# Patient Record
Sex: FEMALE | Race: WHITE | Employment: UNEMPLOYED | URBAN - METROPOLITAN AREA
[De-identification: names, ages, dates, MRNs, and addresses within clinical notes are randomized per-mention and may not be internally consistent; named-entity substitution may affect disease eponyms.]

---

## 2017-01-13 RX ORDER — NITROGLYCERIN 0.4 MG/1
TABLET SUBLINGUAL
Qty: 25 TABLET | Refills: 1 | Status: SHIPPED | OUTPATIENT
Start: 2017-01-13 | End: 2018-07-17 | Stop reason: SDUPTHER

## 2017-01-30 RX ORDER — LEVOTHYROXINE SODIUM 0.03 MG/1
TABLET ORAL
Qty: 30 TABLET | Refills: 3 | Status: SHIPPED | OUTPATIENT
Start: 2017-01-30 | End: 2017-03-30 | Stop reason: SDUPTHER

## 2017-02-20 RX ORDER — AMLODIPINE BESYLATE 2.5 MG/1
TABLET ORAL
Qty: 90 TABLET | Refills: 3 | Status: SHIPPED | OUTPATIENT
Start: 2017-02-20 | End: 2018-02-28 | Stop reason: SDUPTHER

## 2017-02-20 RX ORDER — ATORVASTATIN CALCIUM 10 MG/1
TABLET, FILM COATED ORAL
Qty: 90 TABLET | Refills: 3 | Status: SHIPPED | OUTPATIENT
Start: 2017-02-20 | End: 2017-03-30 | Stop reason: ALTCHOICE

## 2017-02-22 RX ORDER — FLUOXETINE HYDROCHLORIDE 20 MG/1
CAPSULE ORAL
Qty: 30 CAPSULE | Refills: 5 | Status: SHIPPED | OUTPATIENT
Start: 2017-02-22

## 2017-03-27 RX ORDER — LORATADINE 10 MG
TABLET ORAL
Qty: 30 TABLET | Refills: 2 | Status: SHIPPED | OUTPATIENT
Start: 2017-03-27

## 2017-03-30 ENCOUNTER — OFFICE VISIT (OUTPATIENT)
Dept: FAMILY MEDICINE CLINIC | Age: 47
End: 2017-03-30

## 2017-03-30 VITALS — WEIGHT: 170 LBS | BODY MASS INDEX: 33.38 KG/M2 | HEIGHT: 60 IN

## 2017-03-30 DIAGNOSIS — R68.89 FLU-LIKE SYMPTOMS: Primary | ICD-10-CM

## 2017-03-30 DIAGNOSIS — J06.9 BACTERIAL URI: ICD-10-CM

## 2017-03-30 DIAGNOSIS — B96.89 BACTERIAL URI: ICD-10-CM

## 2017-03-30 LAB
INFLUENZA A ANTIBODY: NORMAL
INFLUENZA B ANTIBODY: NORMAL

## 2017-03-30 PROCEDURE — G8484 FLU IMMUNIZE NO ADMIN: HCPCS | Performed by: FAMILY MEDICINE

## 2017-03-30 PROCEDURE — 1036F TOBACCO NON-USER: CPT | Performed by: FAMILY MEDICINE

## 2017-03-30 PROCEDURE — G8427 DOCREV CUR MEDS BY ELIG CLIN: HCPCS | Performed by: FAMILY MEDICINE

## 2017-03-30 PROCEDURE — 87804 INFLUENZA ASSAY W/OPTIC: CPT | Performed by: FAMILY MEDICINE

## 2017-03-30 PROCEDURE — 99213 OFFICE O/P EST LOW 20 MIN: CPT | Performed by: FAMILY MEDICINE

## 2017-03-30 PROCEDURE — G8417 CALC BMI ABV UP PARAM F/U: HCPCS | Performed by: FAMILY MEDICINE

## 2017-03-30 RX ORDER — AZITHROMYCIN 250 MG/1
250 TABLET, FILM COATED ORAL DAILY
Qty: 1 PACKET | Refills: 0 | Status: SHIPPED | OUTPATIENT
Start: 2017-03-30 | End: 2017-06-16

## 2017-03-30 RX ORDER — FLUTICASONE PROPIONATE 50 MCG
2 SPRAY, SUSPENSION (ML) NASAL DAILY
Qty: 1 BOTTLE | Refills: 0 | Status: ON HOLD | OUTPATIENT
Start: 2017-03-30 | End: 2018-06-04

## 2017-03-30 RX ORDER — MORPHINE SULFATE 15 MG/1
15 TABLET ORAL 2 TIMES DAILY
Status: ON HOLD | COMMUNITY
End: 2018-06-05

## 2017-03-30 RX ORDER — BENZONATATE 100 MG/1
100 CAPSULE ORAL 3 TIMES DAILY PRN
Qty: 30 CAPSULE | Refills: 1 | Status: SHIPPED | OUTPATIENT
Start: 2017-03-30 | End: 2017-04-09

## 2017-03-30 ASSESSMENT — ENCOUNTER SYMPTOMS
SORE THROAT: 1
COUGH: 1
EYE PAIN: 0
EYE ITCHING: 0
ABDOMINAL PAIN: 0
VOMITING: 0
DIARRHEA: 0
CONSTIPATION: 0
SHORTNESS OF BREATH: 1
EYE DISCHARGE: 0
RHINORRHEA: 0
NAUSEA: 0

## 2017-04-03 ENCOUNTER — TELEPHONE (OUTPATIENT)
Dept: FAMILY MEDICINE CLINIC | Age: 47
End: 2017-04-03

## 2017-04-04 ENCOUNTER — TELEPHONE (OUTPATIENT)
Dept: FAMILY MEDICINE CLINIC | Age: 47
End: 2017-04-04

## 2017-08-14 ENCOUNTER — OFFICE VISIT (OUTPATIENT)
Dept: CARDIOLOGY CLINIC | Age: 47
End: 2017-08-14

## 2017-08-14 ENCOUNTER — HOSPITAL ENCOUNTER (OUTPATIENT)
Dept: GENERAL RADIOLOGY | Age: 47
Discharge: HOME | End: 2017-08-14
Attending: INTERNAL MEDICINE | Admitting: INTERNAL MEDICINE

## 2017-08-14 VITALS
BODY MASS INDEX: 33.18 KG/M2 | HEIGHT: 60 IN | HEART RATE: 72 BPM | WEIGHT: 169 LBS | SYSTOLIC BLOOD PRESSURE: 118 MMHG | DIASTOLIC BLOOD PRESSURE: 80 MMHG | OXYGEN SATURATION: 97 %

## 2017-08-14 DIAGNOSIS — I20.1 PRINZMETAL ANGINA (HCC): Primary | ICD-10-CM

## 2017-08-14 DIAGNOSIS — I10 ESSENTIAL HYPERTENSION: Chronic | ICD-10-CM

## 2017-08-14 DIAGNOSIS — E55.9 VITAMIN D DEFICIENCY: ICD-10-CM

## 2017-08-14 DIAGNOSIS — I25.119 CORONARY ARTERY DISEASE INVOLVING NATIVE CORONARY ARTERY OF NATIVE HEART WITH ANGINA PECTORIS (HCC): ICD-10-CM

## 2017-08-14 LAB — VITAMIN D 25-HYDROXY: 32.1 NG/ML

## 2017-08-14 PROCEDURE — 1036F TOBACCO NON-USER: CPT | Performed by: INTERNAL MEDICINE

## 2017-08-14 PROCEDURE — G8598 ASA/ANTIPLAT THER USED: HCPCS | Performed by: INTERNAL MEDICINE

## 2017-08-14 PROCEDURE — 99214 OFFICE O/P EST MOD 30 MIN: CPT | Performed by: INTERNAL MEDICINE

## 2017-08-14 PROCEDURE — G8417 CALC BMI ABV UP PARAM F/U: HCPCS | Performed by: INTERNAL MEDICINE

## 2017-08-14 PROCEDURE — G8427 DOCREV CUR MEDS BY ELIG CLIN: HCPCS | Performed by: INTERNAL MEDICINE

## 2017-08-14 RX ORDER — PRAVASTATIN SODIUM 10 MG
5 TABLET ORAL DAILY
Qty: 30 TABLET | Refills: 5 | Status: SHIPPED | OUTPATIENT
Start: 2017-08-14 | End: 2018-08-09 | Stop reason: SDUPTHER

## 2017-08-15 ENCOUNTER — TELEPHONE (OUTPATIENT)
Dept: CARDIOLOGY CLINIC | Age: 47
End: 2017-08-15

## 2017-10-16 RX ORDER — RANOLAZINE 1000 MG/1
500 TABLET, FILM COATED, EXTENDED RELEASE ORAL 2 TIMES DAILY
Qty: 60 TABLET | Refills: 11 | Status: SHIPPED | OUTPATIENT
Start: 2017-10-16 | End: 2018-11-02 | Stop reason: SDUPTHER

## 2017-10-20 ENCOUNTER — HOSPITAL ENCOUNTER (OUTPATIENT)
Dept: MRI IMAGING | Age: 47
Discharge: HOME | End: 2017-10-20

## 2017-10-20 DIAGNOSIS — M50.20 DISPLACEMENT OF CERVICAL INTERVERTEBRAL DISC WITHOUT MYELOPATHY: ICD-10-CM

## 2017-12-18 ENCOUNTER — TELEPHONE (OUTPATIENT)
Dept: FAMILY MEDICINE CLINIC | Age: 47
End: 2017-12-18

## 2018-02-12 ENCOUNTER — OFFICE VISIT (OUTPATIENT)
Dept: CARDIOLOGY CLINIC | Age: 48
End: 2018-02-12

## 2018-02-12 VITALS
HEART RATE: 72 BPM | SYSTOLIC BLOOD PRESSURE: 124 MMHG | BODY MASS INDEX: 33.26 KG/M2 | DIASTOLIC BLOOD PRESSURE: 86 MMHG | HEIGHT: 60 IN | WEIGHT: 169.4 LBS | OXYGEN SATURATION: 96 %

## 2018-02-12 DIAGNOSIS — I25.118 CORONARY ARTERY DISEASE INVOLVING NATIVE CORONARY ARTERY OF NATIVE HEART WITH OTHER FORM OF ANGINA PECTORIS (HCC): ICD-10-CM

## 2018-02-12 DIAGNOSIS — E78.2 MIXED HYPERLIPIDEMIA: ICD-10-CM

## 2018-02-12 DIAGNOSIS — I20.8 CARDIAC SYNDROME X (HCC): ICD-10-CM

## 2018-02-12 DIAGNOSIS — I10 ESSENTIAL HYPERTENSION: Primary | ICD-10-CM

## 2018-02-12 PROCEDURE — G8417 CALC BMI ABV UP PARAM F/U: HCPCS | Performed by: NURSE PRACTITIONER

## 2018-02-12 PROCEDURE — G8598 ASA/ANTIPLAT THER USED: HCPCS | Performed by: NURSE PRACTITIONER

## 2018-02-12 PROCEDURE — 1036F TOBACCO NON-USER: CPT | Performed by: NURSE PRACTITIONER

## 2018-02-12 PROCEDURE — 99213 OFFICE O/P EST LOW 20 MIN: CPT | Performed by: NURSE PRACTITIONER

## 2018-02-12 PROCEDURE — G8484 FLU IMMUNIZE NO ADMIN: HCPCS | Performed by: NURSE PRACTITIONER

## 2018-02-12 PROCEDURE — G8427 DOCREV CUR MEDS BY ELIG CLIN: HCPCS | Performed by: NURSE PRACTITIONER

## 2018-02-12 NOTE — LETTER
[address] Cardiology - [facility] [address]  Phone: [phone]  Fax: 305 [facility],         February 12, 2018     Jyotsna Samson MD  Fortino Coburn    Patient: Atiya Swenson  MR Number: [id number]  YOB: 1970  Date of Visit: 2/12/2018    Dear Dr. Jyotsna Samson: Thank you for the request for consultation for [address] [place] [address] to me for the evaluation. Below are the relevant portions of my assessment and plan of care. [address]   Cardiac Evaluation    Primary Care Doctor:  Jyotsna Samson MD    Chief Complaint   Patient presents with    6 Month Follow-Up    Hypertension    Chest Pain     when she does to much she has sob with cp    Shortness of Breath     with any activity    Dizziness     at times gets dizzy, she states she had the flu when she was dizzy.  Edema     feet and legs when she is up all day    Fatigue     due to flu and strep     Palpitations     pounds art times. History of Present Illness:   I had the pleasure of seeing Atiya Swenson in follow up for NOCAD with endothelial dysfunction and vasospasm, stable angina, HTN, HLD. At last visit she was started on pravastatin 5 mg due to intolerance to Vytorin, pravastatin (higher dose) and atorvastatin. She is tolerating this okay. She reports chest pain with moderate activities such as vacuuming. She exercises regularly with walking and pool/ water exercises. She does okay with this. She is limited due to back problems and fibromyalgia's. She has shortness of breath with activity resolves with rest.  She had 2 episodes of flu with strep infection in between. She had lightheadedness with this. She has some palpitations, worse when she lies down, resolves if she concentrates on something else.       Atiya Swenson describes symptoms including chest pain, dyspnea, fatigue, edema, palpitations but denies orthopnea, PND, early saiety, syncope. NYHA:   IIb  ACC/ AHA Stage:    C    Past Medical History:   has a past medical history of DDD (degenerative disc disease); Ehler's-Danlos syndrome; Factor V Leiden ([address] Fibromyalgia; Hyperlipidemia; Hypertension; IBS (irritable bowel syndrome); Intervertebral disc protrusion; Migraine; MRSA (methicillin resistant staph aureus) culture positive; MRSA nasal colonization; Nausea & vomiting; Psychiatric problem; Reflux; Thyroid disease; and Wears glasses. Surgical History:   has a past surgical history that includes Hysterectomy; Tonsillectomy; Foot surgery; laparoscopy (3/16/2012); fracture surgery; Endoscopy, colon, diagnostic; Colonoscopy; eye surgery; Upper gastrointestinal endoscopy (9/1012); and Cardiac catheterization (2/28/2014). Social History:   reports that she has never smoked. She has never used smokeless tobacco. She reports that she does not drink alcohol or use drugs. Family History:   Family History   Problem Relation Age of Onset    Heart Disease Mother     Kidney Disease Mother     Stroke Father     Cancer Father     Heart Disease Father     Heart Disease Brother        Home Medications:  Prior to Admission medications    Medication Sig Start Date End Date Taking? Authorizing Provider   Cholecalciferol (VITAMIN D PO) Take by mouth   Yes Historical Provider, MD   RANEXA 1000 MG extended release tablet TAKE 1 TABLET BY MOUTH 2 TIMES DAILY 10/16/17  Yes Yaritza Asencio MD   pravastatin (PRAVACHOL) 10 MG tablet Take 0.5 tablets by mouth daily 8/14/17  Yes John Rodriguez MD   ondansetron (ZOFRAN ODT) 4 MG disintegrating tablet Take 1 tablet by mouth every 8 hours as needed for Nausea or Vomiting 6/16/17  Yes Ginger Mora NP   morphine (MSIR) 15 MG tablet Take 15 mg by mouth 2 times daily .    Yes Historical Provider, MD   [facility] ASPIRIN ADULT LOW DOSE 81 MG chewable tablet TAKE 1 TABLET BY MOUTH HCT 39.7 06/16/2017    HCT 38.6 10/08/2015    HCT 35.1 06/21/2015    MCV 89.0 06/16/2017    MCV 92.3 10/08/2015    MCV 92.7 06/21/2015    RDW 14.3 06/16/2017    RDW 13.7 10/08/2015    RDW 13.4 06/21/2015     06/16/2017     10/08/2015     06/21/2015     BMP:  Lab Results   Component Value Date     06/16/2017     10/08/2015     06/21/2015    K 4.0 06/16/2017    K 4.1 10/08/2015    K 4.1 06/21/2015     06/16/2017     10/08/2015     06/21/2015    CO2 24 06/16/2017    CO2 23 10/08/2015    CO2 28 06/21/2015    PHOS 3.8 06/21/2015    PHOS 2.8 06/19/2015    PHOS 3.0 06/17/2015    BUN 13 06/16/2017    BUN 16 10/08/2015    BUN 4 06/21/2015    CREATININE 1.0 06/16/2017    CREATININE 1.0 10/08/2015    CREATININE 0.9 06/21/2015     BNP: No results found for: PROBNP     Cardiac Imaging:  STRESS TEST: NM 02/03/2014  Findings: Mildly decreased uptake is seen in the inferolateral wall extending to the apex. This appears similar on both stress and rest images. Perfusion is otherwise normal with no reversibility identified. Left ventricular cavity size is normal. Wall motion is uniform. The estimated left ventricular ejection fraction is 74%. Impression: Probable inferolateral and apical scar with no reversibility seen. CARDIAC CATH: 03/03/2014  ANGIOGRAPHIC FINDINGS:  1. Left main coronary artery was short, with no significant focal stenosis. It did appear to trifurcate into an LAD, ramus branch, and left circumflex. 2.  The LAD artery was a large vessel that ran in the intraventricular groove before wrapping the apex distally. Had evidence of luminal irregularities throughout its course, without significant focal stenosis. 3.  The ramus intermedius branch was also a very large vessel that ran on the anterior aspect of the heart. It did branch in its distal aspects.   There was evidence of a tubular lesion about 20% in the proximal portion

## 2018-02-12 NOTE — PROGRESS NOTES
[address]   Cardiac Evaluation    Primary Care Doctor:  Delfina Harding MD    Chief Complaint   Patient presents with    6 Month Follow-Up    Hypertension    Chest Pain     when she does to much she has sob with cp    Shortness of Breath     with any activity    Dizziness     at times gets dizzy, she states she had the flu when she was dizzy.  Edema     feet and legs when she is up all day    Fatigue     due to flu and strep     Palpitations     pounds art times. History of Present Illness:   I had the pleasure of seeing Yazan Melendez in follow up for NOCAD with endothelial dysfunction and vasospasm, stable angina, HTN, HLD. At last visit she was started on pravastatin 5 mg due to intolerance to Vytorin, pravastatin (higher dose) and atorvastatin. She is tolerating this okay. She reports chest pain with moderate activities such as vacuuming. She exercises regularly with walking and pool/ water exercises. She does okay with this. She is limited due to back problems and fibromyalgia's. She has shortness of breath with activity resolves with rest.  She had 2 episodes of flu with strep infection in between. She had lightheadedness with this. She has some palpitations, worse when she lies down, resolves if she concentrates on something else. Yazan  describes symptoms including chest pain, dyspnea, fatigue, edema, palpitations but denies orthopnea, PND, early saiety, syncope. NYHA:   IIb  ACC/ AHA Stage:    C    Past Medical History:   has a past medical history of DDD (degenerative disc disease); Ehler's-Danlos syndrome; Factor V Leiden ([address] Fibromyalgia; Hyperlipidemia; Hypertension; IBS (irritable bowel syndrome); Intervertebral disc protrusion; Migraine; MRSA (methicillin resistant staph aureus) culture positive; MRSA nasal colonization; Nausea & vomiting; Psychiatric problem; Reflux; Thyroid disease; and Wears glasses.   Surgical History:   has a past surgical history that includes Hysterectomy; Tonsillectomy; Foot surgery; laparoscopy (3/16/2012); fracture surgery; Endoscopy, colon, diagnostic; Colonoscopy; eye surgery; Upper gastrointestinal endoscopy (9/1012); and Cardiac catheterization (2/28/2014). Social History:   reports that she has never smoked. She has never used smokeless tobacco. She reports that she does not drink alcohol or use drugs. Family History:   Family History   Problem Relation Age of Onset    Heart Disease Mother     Kidney Disease Mother     Stroke Father     Cancer Father     Heart Disease Father     Heart Disease Brother        Home Medications:  Prior to Admission medications    Medication Sig Start Date End Date Taking? Authorizing Provider   Cholecalciferol (VITAMIN D PO) Take by mouth   Yes Historical Provider, MD   RANEXA 1000 MG extended release tablet TAKE 1 TABLET BY MOUTH 2 TIMES DAILY 10/16/17  Yes Nirav Folye MD   pravastatin (PRAVACHOL) 10 MG tablet Take 0.5 tablets by mouth daily 8/14/17  Yes Ti Arnold MD   ondansetron (ZOFRAN ODT) 4 MG disintegrating tablet Take 1 tablet by mouth every 8 hours as needed for Nausea or Vomiting 6/16/17  Yes Anselmo Mccoy NP   morphine (MSIR) 15 MG tablet Take 15 mg by mouth 2 times daily . Yes Historical Provider, MD   [facility] ASPIRIN ADULT LOW DOSE 81 MG chewable tablet TAKE 1 TABLET BY MOUTH EVERY DAY 3/27/17  Yes Marisa Gruber MD   FLUoxetine (PROZAC) 20 MG capsule TAKE 1 CAPSULE BY MOUTH DAILY. 2/22/17  Yes Marisa Gruber MD   amLODIPine (NORVASC) 2.5 MG tablet TAKE 1 TABLET BY MOUTH DAILY. 2/20/17  Yes Ti Arnold MD   nitroGLYCERIN (NITROSTAT) 0.4 MG SL tablet Place one tablet under the tongue every 5 minutes as needed for chest pain.  1/13/17  Yes Ti Arnold MD   clorazepate (TRANXENE) 7.5 MG tablet TAKE 1 TABLET BY MOUTH TWICE A DAY AS NEEDED FOR ANXIETY 1/13/17  Yes Marisa Gruber MD   LYRICA 50 MG capsule TAKE ONE CAPSULE BY MOUTH DAILY 7/6/16  Yes Historical Provider, MD   levothyroxine (SYNTHROID) 25 MCG tablet TAKE 1 TABLET BY MOUTH DAILY. 9/9/15  Yes Tammy Salamanca MD   omeprazole (PRILOSEC) 40 MG capsule TAKE ONE CAPSULE EVERY DAY 7/5/15  Yes Davin Soler DO   ibuprofen (ADVIL;MOTRIN) 600 MG tablet Take 1 tablet by mouth 3 times daily. 10/12/14  Yes Guillermina Fajardo MD   butalbital-acetaminophen-caffeine (FIORICET, ESGIC) per tablet Take 1 tablet by mouth every 4 hours as needed for Pain or Headaches. Yes Historical Provider, MD   Polyethylene Glycol 3350 (MIRALAX PO) Take  by mouth. Yes Historical Provider, MD   fluticasone [facility]) 50 MCG/ACT nasal spray 2 sprays by Nasal route daily for 5 days 3/30/17 4/4/17  Jacquie Robles DO        Allergies:  Prochlorperazine edisylate; Neurontin [gabapentin]; Other; Hydrocodone-acetaminophen; and Hydromorphone     Review of Systems:   · Constitutional: there has been no unanticipated weight loss. There's been no change in energy level, sleep pattern, or activity level. · Eyes: No visual changes or diplopia. No scleral icterus. · ENT: No Headaches, hearing loss or vertigo. No mouth sores or sore throat. · Cardiovascular: Reviewed in HPI  · Respiratory: No cough or wheezing, no sputum production. No hematemesis. · Gastrointestinal: No abdominal pain, appetite loss, blood in stools. No change in bowel or bladder habits. · Genitourinary: No dysuria, trouble voiding, or hematuria. · Musculoskeletal:  No gait disturbance, weakness or joint complaints. · Integumentary: No rash or pruritis. · Neurological: No headache, diplopia, change in muscle strength, numbness or tingling. No change in gait, balance, coordination, mood, affect, memory, mentation, behavior. · Psychiatric: No anxiety, no depression. · Endocrine: No malaise, fatigue or temperature intolerance. No excessive thirst, fluid intake, or urination. No tremor.   · Hematologic/Lymphatic: No abnormal bruising or bleeding, blood clots or swollen lymph nodes.  · Allergic/Immunologic: No nasal congestion or hives. Physical Examination:    Vitals:    02/12/18 1105   BP: 124/86   Pulse: 72   SpO2: 96%   Weight: 169 lb 6.4 oz (76.8 kg)   Height: 5' (1.524 m)        Constitutional and General Appearance: Warm and dry, no apparent distress, normal coloration  HEENT:  Normocephalic, atraumatic  Respiratory:  · Normal excursion and expansion without use of accessory muscles  · Resp Auscultation: Normal breath sounds without dullness  Cardiovascular:  · The apical impulses not displaced  · Heart tones are crisp and normal  · JVP 8 cm H2O  · Regular rate and rhythm, normal S1S2, no m/g/r/c  · Peripheral pulses are symmetrical and full  · There is no clubbing, cyanosis of the extremities.   · No edema  · Pedal Pulses: 2+ and equal   Abdomen:  · No masses or tenderness  · Liver/Spleen: No Abnormalities Noted  Neurological/Psychiatric:  · Alert and oriented in all spheres  · Moves all extremities well  · Exhibits normal gait balance and coordination  · No abnormalities of mood, affect, memory, mentation, or behavior are noted    Lab Data:    CBC:   Lab Results   Component Value Date    WBC 8.9 06/16/2017    WBC 6.5 10/08/2015    WBC 5.2 06/21/2015    RBC 4.46 06/16/2017    RBC 4.18 10/08/2015    RBC 3.79 06/21/2015    HGB 13.5 06/16/2017    HGB 12.7 10/08/2015    HGB 11.7 06/21/2015    HCT 39.7 06/16/2017    HCT 38.6 10/08/2015    HCT 35.1 06/21/2015    MCV 89.0 06/16/2017    MCV 92.3 10/08/2015    MCV 92.7 06/21/2015    RDW 14.3 06/16/2017    RDW 13.7 10/08/2015    RDW 13.4 06/21/2015     06/16/2017     10/08/2015     06/21/2015     BMP:  Lab Results   Component Value Date     06/16/2017     10/08/2015     06/21/2015    K 4.0 06/16/2017    K 4.1 10/08/2015    K 4.1 06/21/2015     06/16/2017     10/08/2015     06/21/2015    CO2 24 06/16/2017    CO2 23 10/08/2015    CO2 28 06/21/2015    PHOS 3.8 06/21/2015    PHOS 2.8 06/19/2015    PHOS 3.0 06/17/2015    BUN 13 06/16/2017    BUN 16 10/08/2015    BUN 4 06/21/2015    CREATININE 1.0 06/16/2017    CREATININE 1.0 10/08/2015    CREATININE 0.9 06/21/2015     BNP: No results found for: PROBNP     Cardiac Imaging:  STRESS TEST: NM 02/03/2014  Findings: Mildly decreased uptake is seen in the inferolateral wall extending to the apex. This appears similar on both stress and rest images. Perfusion is otherwise normal with no reversibility identified. Left ventricular cavity size is normal. Wall motion is uniform. The estimated left ventricular ejection fraction is 74%. Impression: Probable inferolateral and apical scar with no reversibility seen. CARDIAC CATH: 03/03/2014  ANGIOGRAPHIC FINDINGS:  1. Left main coronary artery was short, with no significant focal stenosis. It did appear to trifurcate into an LAD, ramus branch, and left circumflex. 2.  The LAD artery was a large vessel that ran in the intraventricular groove before wrapping the apex distally. Had evidence of luminal irregularities throughout its course, without significant focal stenosis. 3.  The ramus intermedius branch was also a very large vessel that ran on the anterior aspect of the heart. It did branch in its distal aspects. There was evidence of a tubular lesion about 20% in the proximal portion that was not flow limiting. 4.  The left circumflex was a very large vessel, giving off a decent sized OM branch, as well as several small PLOMs. There was a 20% to 30% lesion that was noted in the proximal portion of the left circumflex. However, the diagnostic catheter was deeply seated. After the administration of sublingual nitroglycerin, this did improve, consistent with coronary vasospasm. 5.  The right coronary artery was noted to be dominant. Had luminal irregularities through out its course.   There was slow CHRIS-2 flow throughout the LAD, consistent with microvascular disease/endophyll

## 2018-02-12 NOTE — COMMUNICATION BODY
Provider, MD   levothyroxine (SYNTHROID) 25 MCG tablet TAKE 1 TABLET BY MOUTH DAILY. 9/9/15  Yes Damion Uribe MD   omeprazole (PRILOSEC) 40 MG capsule TAKE ONE CAPSULE EVERY DAY 7/5/15  Yes Corona Samaniego DO   ibuprofen (ADVIL;MOTRIN) 600 MG tablet Take 1 tablet by mouth 3 times daily. 10/12/14  Yes Nahomi Fajardo MD   butalbital-acetaminophen-caffeine (FIORICET, ESGIC) per tablet Take 1 tablet by mouth every 4 hours as needed for Pain or Headaches. Yes Historical Provider, MD   Polyethylene Glycol 3350 (MIRALAX PO) Take  by mouth. Yes Historical Provider, MD   fluticasone [facility] Osier) 50 MCG/ACT nasal spray 2 sprays by Nasal route daily for 5 days 3/30/17 4/4/17  Aaron Gowers, DO        Allergies:  Prochlorperazine edisylate; Neurontin [gabapentin]; Other; Hydrocodone-acetaminophen; and Hydromorphone     Review of Systems:   · Constitutional: there has been no unanticipated weight loss. There's been no change in energy level, sleep pattern, or activity level. · Eyes: No visual changes or diplopia. No scleral icterus. · ENT: No Headaches, hearing loss or vertigo. No mouth sores or sore throat. · Cardiovascular: Reviewed in HPI  · Respiratory: No cough or wheezing, no sputum production. No hematemesis. · Gastrointestinal: No abdominal pain, appetite loss, blood in stools. No change in bowel or bladder habits. · Genitourinary: No dysuria, trouble voiding, or hematuria. · Musculoskeletal:  No gait disturbance, weakness or joint complaints. · Integumentary: No rash or pruritis. · Neurological: No headache, diplopia, change in muscle strength, numbness or tingling. No change in gait, balance, coordination, mood, affect, memory, mentation, behavior. · Psychiatric: No anxiety, no depression. · Endocrine: No malaise, fatigue or temperature intolerance. No excessive thirst, fluid intake, or urination. No tremor.   · Hematologic/Lymphatic: No abnormal bruising or bleeding, blood clots or swollen lymph 06/19/2015    PHOS 3.0 06/17/2015    BUN 13 06/16/2017    BUN 16 10/08/2015    BUN 4 06/21/2015    CREATININE 1.0 06/16/2017    CREATININE 1.0 10/08/2015    CREATININE 0.9 06/21/2015     BNP: No results found for: PROBNP     Cardiac Imaging:  STRESS TEST: NM 02/03/2014  Findings: Mildly decreased uptake is seen in the inferolateral wall extending to the apex. This appears similar on both stress and rest images. Perfusion is otherwise normal with no reversibility identified. Left ventricular cavity size is normal. Wall motion is uniform. The estimated left ventricular ejection fraction is 74%. Impression: Probable inferolateral and apical scar with no reversibility seen. CARDIAC CATH: 03/03/2014  ANGIOGRAPHIC FINDINGS:  1. Left main coronary artery was short, with no significant focal stenosis. It did appear to trifurcate into an LAD, ramus branch, and left circumflex. 2.  The LAD artery was a large vessel that ran in the intraventricular groove before wrapping the apex distally. Had evidence of luminal irregularities throughout its course, without significant focal stenosis. 3.  The ramus intermedius branch was also a very large vessel that ran on the anterior aspect of the heart. It did branch in its distal aspects. There was evidence of a tubular lesion about 20% in the proximal portion that was not flow limiting. 4.  The left circumflex was a very large vessel, giving off a decent sized OM branch, as well as several small PLOMs. There was a 20% to 30% lesion that was noted in the proximal portion of the left circumflex. However, the diagnostic catheter was deeply seated. After the administration of sublingual nitroglycerin, this did improve, consistent with coronary vasospasm. 5.  The right coronary artery was noted to be dominant. Had luminal irregularities through out its course.   There was slow CHRIS-2 flow throughout the LAD, consistent with microvascular disease/endophyll

## 2018-02-28 RX ORDER — AMLODIPINE BESYLATE 2.5 MG/1
TABLET ORAL
Qty: 90 TABLET | Refills: 3 | Status: SHIPPED | OUTPATIENT
Start: 2018-02-28 | End: 2019-02-12 | Stop reason: SDUPTHER

## 2018-06-05 PROBLEM — F41.9 ANXIETY: Status: ACTIVE | Noted: 2018-06-05

## 2018-06-05 PROBLEM — I20.8 CARDIAC SYNDROME X (HCC): Status: ACTIVE | Noted: 2018-06-05

## 2018-06-05 PROBLEM — I20.89 CARDIAC SYNDROME X: Status: ACTIVE | Noted: 2018-06-05

## 2018-07-18 RX ORDER — NITROGLYCERIN 0.4 MG/1
TABLET SUBLINGUAL
Qty: 25 TABLET | Refills: 0 | Status: SHIPPED | OUTPATIENT
Start: 2018-07-18 | End: 2020-01-17

## 2018-08-09 RX ORDER — PRAVASTATIN SODIUM 10 MG
TABLET ORAL
Qty: 45 TABLET | Refills: 3 | Status: SHIPPED | OUTPATIENT
Start: 2018-08-09 | End: 2019-09-13 | Stop reason: SDUPTHER

## 2018-10-09 ENCOUNTER — OFFICE VISIT (OUTPATIENT)
Dept: CARDIOLOGY CLINIC | Age: 48
End: 2018-10-09
Payer: MEDICARE

## 2018-10-09 VITALS
SYSTOLIC BLOOD PRESSURE: 100 MMHG | DIASTOLIC BLOOD PRESSURE: 70 MMHG | WEIGHT: 166.8 LBS | OXYGEN SATURATION: 98 % | BODY MASS INDEX: 32.58 KG/M2 | HEART RATE: 75 BPM

## 2018-10-09 DIAGNOSIS — I20.8 CARDIAC SYNDROME X (HCC): Primary | ICD-10-CM

## 2018-10-09 DIAGNOSIS — I10 ESSENTIAL HYPERTENSION: Chronic | ICD-10-CM

## 2018-10-09 DIAGNOSIS — Z86.79 HISTORY OF CORONARY ARTERY DISEASE: ICD-10-CM

## 2018-10-09 DIAGNOSIS — E78.2 MIXED HYPERLIPIDEMIA: ICD-10-CM

## 2018-10-09 PROCEDURE — 99214 OFFICE O/P EST MOD 30 MIN: CPT | Performed by: INTERNAL MEDICINE

## 2018-10-09 NOTE — LETTER
No evidence for sudden cardiac death or premature CAD    Home Medications:  Reviewed and are listed in nursing record. and/or listed below  Current Outpatient Prescriptions   Medication Sig Dispense Refill    pravastatin (PRAVACHOL) 10 MG tablet TAKE ONE-HALF TABLET BY MOUTH DAILY 45 tablet 3    nitroGLYCERIN (NITROSTAT) 0.4 MG SL tablet PLACE ONE TABLET UNDER THE TONGUE EVERY 5 MINUTES AS NEEDED FOR CHEST PAIN. 25 tablet 0    morphine (MS CONTIN) 15 MG extended release tablet Take 15 mg by mouth 2 times daily. Vahid Acrkolby  tiZANidine (ZANAFLEX) 4 MG tablet Take 1 tablet by mouth nightly as needed  2    amLODIPine (NORVASC) 2.5 MG tablet TAKE 1 TABLET BY MOUTH DAILY. 90 tablet 3    Cholecalciferol (VITAMIN D PO) Take by mouth      RANEXA 1000 MG extended release tablet TAKE 1 TABLET BY MOUTH 2 TIMES DAILY 60 tablet 11    ondansetron (ZOFRAN ODT) 4 MG disintegrating tablet Take 1 tablet by mouth every 8 hours as needed for Nausea or Vomiting 20 tablet 0    [facility] ASPIRIN ADULT LOW DOSE 81 MG chewable tablet TAKE 1 TABLET BY MOUTH EVERY DAY 30 tablet 2    FLUoxetine (PROZAC) 20 MG capsule TAKE 1 CAPSULE BY MOUTH DAILY. 30 capsule 5    clorazepate (TRANXENE) 7.5 MG tablet TAKE 1 TABLET BY MOUTH TWICE A DAY AS NEEDED FOR ANXIETY 60 tablet 0    LYRICA 50 MG capsule TAKE ONE CAPSULE BY MOUTH DAILY  2    levothyroxine (SYNTHROID) 25 MCG tablet TAKE 1 TABLET BY MOUTH DAILY. 30 tablet 6    omeprazole (PRILOSEC) 40 MG capsule TAKE ONE CAPSULE EVERY DAY 30 capsule 0    ibuprofen (ADVIL;MOTRIN) 600 MG tablet Take 1 tablet by mouth 3 times daily. 120 tablet 0    butalbital-acetaminophen-caffeine (FIORICET, ESGIC) per tablet Take 1 tablet by mouth every 4 hours as needed for Pain or Headaches.  Polyethylene Glycol 3350 (MIRALAX PO) Take  by mouth. No current facility-administered medications for this visit. Allergies:  Prochlorperazine edisylate; Neurontin [gabapentin];  Other; was evidence of a tubular lesion about 20% in the proximal portion that was  not flow limiting. 4.  The left circumflex was a very large vessel, giving off a decent sized OM  branch, as well as several small PLOMs. There was a 20% to 30% lesion that  was noted in the proximal portion of the left circumflex. However, the  diagnostic catheter was deeply seated. After the administration of  sublingual nitroglycerin, this did improve, consistent with coronary  vasospasm. 5.  The right coronary artery was noted to be dominant. Had luminal  irregularities through out its course. There was slow CHRIS-2 flow throughout  the LAD, consistent with microvascular disease/endophyll dysfunction. ASSESSMENT AND PLAN:  At this time, the patient presents with nonobstructive  coronary artery disease as dictated above. She does have evidence of  coronary vasospasm in the left circumflex that is possibly catheter induced, that did improve with nitroglycerin. She has evidence of CHRIS-2 sluggish flow in the RCA, suggesting endothelial dysfunction. However, No significant stenosis requiring intervention at this time. We will go ahead and start her on Norvasc for her problems as above, and she will follow up with Dr. Kevin Hughes for her symptomatology. VASCULAR/OTHER IMAGING:  CTA pulm: 10/09/14  FINDINGS: IV contrast was given using CTPA technique. No pulmonary emboli. No pleural effusions. No acute. No lung mass. There is a tiny liver cyst.        Assessment and Plan   Abena Bass is a 46 y.o. female who presents today for the following problems:      1. CP/Prinzmetal's angina: secondary to coronary vasospasm vs endothelial dysfunction. - still rare occurrence doing well  2. Nonobstructive CAD  3. HLD: intolerant to statins but confounded by fibromyalgia   -  Myalgias (on 20mg dose of lipitor  4. HTN: controlled    Plan:  1. Labs - Lipids  2.  Increase pravastatin to 10 mg

## 2018-10-09 NOTE — PROGRESS NOTES
HDL 55 10/08/2015    HDL 47 10/10/2014            Lab Results   Component Value Date    LDLCALC 151 (H) 2018    LDLCALC 131 (H) 10/08/2015    LDLCALC 171 (H) 10/10/2014            Lab Results   Component Value Date    LABVLDL 44 2018    LABVLDL 44 10/08/2015    LABVLDL 47 10/10/2014         CARDIAC DATA   EK2014 HR 76  Sinus Rhythm    ECHO/MUGA:    STRESS TEST: NM 2014  Findings: Mildly decreased uptake is seen in the inferolateral wall extending to the apex. This appears similar on both stress and rest images. Perfusion is otherwise normal with no reversibility identified. Left ventricular cavity size is normal. Wall motion is uniform. The estimated left ventricular ejection fraction is 74%. Impression: Probable inferolateral and apical scar with no reversibility seen. CARDIAC CATH: 2014  ANGIOGRAPHIC FINDINGS:  1. Left main coronary artery was short, with no significant focal stenosis. It did appear to trifurcate into an LAD, ramus branch, and left circumflex. 2.  The LAD artery was a large vessel that ran in the intraventricular groove  before wrapping the apex distally. Had evidence of luminal irregularities  throughout its course, without significant focal stenosis. 3.  The ramus intermedius branch was also a very large vessel that ran on the  anterior aspect of the heart. It did branch in its distal aspects. There  was evidence of a tubular lesion about 20% in the proximal portion that was  not flow limiting. 4.  The left circumflex was a very large vessel, giving off a decent sized OM  branch, as well as several small PLOMs. There was a 20% to 30% lesion that  was noted in the proximal portion of the left circumflex. However, the  diagnostic catheter was deeply seated. After the administration of  sublingual nitroglycerin, this did improve, consistent with coronary  vasospasm. 5.  The right coronary artery was noted to be dominant.   Had luminal  irregularities through out its course. There was slow CHRIS-2 flow throughout  the LAD, consistent with microvascular disease/endophyll dysfunction. ASSESSMENT AND PLAN:  At this time, the patient presents with nonobstructive  coronary artery disease as dictated above. She does have evidence of  coronary vasospasm in the left circumflex that is possibly catheter induced, that did improve with nitroglycerin. She has evidence of CHRIS-2 sluggish flow in the RCA, suggesting endothelial dysfunction. However, No significant stenosis requiring intervention at this time. We will go ahead and start her on Norvasc for her problems as above, and she will follow up with Dr. Doyle Benitez for her symptomatology. VASCULAR/OTHER IMAGING:  CTA pulm: 10/09/14  FINDINGS: IV contrast was given using CTPA technique. No pulmonary emboli. No pleural effusions. No acute. No lung mass. There is a tiny liver cyst.        Assessment and Plan   Emelyn Thomas is a 46 y.o. female who presents today for the following problems:      1. CP/Prinzmetal's angina: secondary to coronary vasospasm vs endothelial dysfunction. - still rare occurrence doing well  2. Nonobstructive CAD  3. HLD: intolerant to statins but confounded by fibromyalgia   -  Myalgias (on 20mg dose of lipitor  4. HTN: controlled    Plan:  1. Labs - Lipids  2. Increase pravastatin to 10 mg  3. Follow up with me in 1 year      It is a pleasure to assist in the care of Emelyn Thomas. Please call with any questions. All questions and concerns were addressed to the patient/family. Alternatives to my treatment were discussed. The note was completed using EMR. Every effort was made to ensure accuracy; however, inadvertent computerized transcription errors may be present.     Monet Otoole MD, [facility] [place]   [phone] [facility]  [phone] [address] [place] [address]  10/9/2018  11:24 AM

## 2018-10-15 ENCOUNTER — APPOINTMENT (OUTPATIENT)
Dept: GENERAL RADIOLOGY | Age: 48
End: 2018-10-15
Payer: MEDICARE

## 2018-10-15 ENCOUNTER — HOSPITAL ENCOUNTER (EMERGENCY)
Age: 48
Discharge: HOME | End: 2018-10-15
Attending: EMERGENCY MEDICINE
Payer: MEDICARE

## 2018-10-15 VITALS
WEIGHT: 160 LBS | SYSTOLIC BLOOD PRESSURE: 142 MMHG | HEIGHT: 60 IN | DIASTOLIC BLOOD PRESSURE: 96 MMHG | BODY MASS INDEX: 31.41 KG/M2 | TEMPERATURE: 98.4 F | OXYGEN SATURATION: 96 % | HEART RATE: 71 BPM | RESPIRATION RATE: 16 BRPM

## 2018-10-15 DIAGNOSIS — M79.672 LEFT FOOT PAIN: Primary | ICD-10-CM

## 2018-10-15 PROCEDURE — 6370000000 HC RX 637 (ALT 250 FOR IP): Performed by: EMERGENCY MEDICINE

## 2018-10-15 PROCEDURE — 99283 EMERGENCY DEPT VISIT LOW MDM: CPT

## 2018-10-15 PROCEDURE — 6360000002 HC RX W HCPCS: Performed by: EMERGENCY MEDICINE

## 2018-10-15 PROCEDURE — 73630 X-RAY EXAM OF FOOT: CPT

## 2018-10-15 PROCEDURE — 73610 X-RAY EXAM OF ANKLE: CPT

## 2018-10-15 RX ORDER — NAPROXEN 250 MG/1
500 TABLET ORAL ONCE
Status: COMPLETED | OUTPATIENT
Start: 2018-10-15 | End: 2018-10-15

## 2018-10-15 RX ORDER — ONDANSETRON 4 MG/1
4 TABLET, ORALLY DISINTEGRATING ORAL ONCE
Status: COMPLETED | OUTPATIENT
Start: 2018-10-15 | End: 2018-10-15

## 2018-10-15 RX ADMIN — NAPROXEN 500 MG: 250 TABLET ORAL at 16:07

## 2018-10-15 RX ADMIN — ONDANSETRON 4 MG: 4 TABLET, ORALLY DISINTEGRATING ORAL at 16:20

## 2018-10-15 ASSESSMENT — PAIN SCALES - GENERAL
PAINLEVEL_OUTOF10: 5
PAINLEVEL_OUTOF10: 7
PAINLEVEL_OUTOF10: 7

## 2018-10-21 NOTE — ED PROVIDER NOTES
 Drug use: No    Sexual activity: Yes     Partners: Male     Other Topics Concern    Not on file     Social History Narrative    No narrative on file     No current facility-administered medications for this encounter. Current Outpatient Prescriptions   Medication Sig Dispense Refill    pravastatin (PRAVACHOL) 10 MG tablet TAKE ONE-HALF TABLET BY MOUTH DAILY 45 tablet 3    nitroGLYCERIN (NITROSTAT) 0.4 MG SL tablet PLACE ONE TABLET UNDER THE TONGUE EVERY 5 MINUTES AS NEEDED FOR CHEST PAIN. 25 tablet 0    tiZANidine (ZANAFLEX) 4 MG tablet Take 1 tablet by mouth nightly as needed  2    amLODIPine (NORVASC) 2.5 MG tablet TAKE 1 TABLET BY MOUTH DAILY. 90 tablet 3    Cholecalciferol (VITAMIN D PO) Take by mouth      RANEXA 1000 MG extended release tablet TAKE 1 TABLET BY MOUTH 2 TIMES DAILY 60 tablet 11    ondansetron (ZOFRAN ODT) 4 MG disintegrating tablet Take 1 tablet by mouth every 8 hours as needed for Nausea or Vomiting 20 tablet 0    FLUoxetine (PROZAC) 20 MG capsule TAKE 1 CAPSULE BY MOUTH DAILY. 30 capsule 5    clorazepate (TRANXENE) 7.5 MG tablet TAKE 1 TABLET BY MOUTH TWICE A DAY AS NEEDED FOR ANXIETY 60 tablet 0    LYRICA 50 MG capsule TAKE ONE CAPSULE BY MOUTH DAILY  2    levothyroxine (SYNTHROID) 25 MCG tablet TAKE 1 TABLET BY MOUTH DAILY. 30 tablet 6    omeprazole (PRILOSEC) 40 MG capsule TAKE ONE CAPSULE EVERY DAY 30 capsule 0    ibuprofen (ADVIL;MOTRIN) 600 MG tablet Take 1 tablet by mouth 3 times daily. 120 tablet 0    Polyethylene Glycol 3350 (MIRALAX PO) Take  by mouth.  morphine (MS CONTIN) 15 MG extended release tablet Take 15 mg by mouth 2 times daily. Arturo [facility] ASPIRIN ADULT LOW DOSE 81 MG chewable tablet TAKE 1 TABLET BY MOUTH EVERY DAY 30 tablet 2    butalbital-acetaminophen-caffeine (FIORICET, ESGIC) per tablet Take 1 tablet by mouth every 4 hours as needed for Pain or Headaches.        Allergies   Allergen Reactions    Prochlorperazine Edisylate      Agitated

## 2018-11-02 RX ORDER — RANOLAZINE 1000 MG/1
1000 TABLET, EXTENDED RELEASE ORAL 2 TIMES DAILY
Qty: 180 TABLET | Refills: 3 | Status: SHIPPED | OUTPATIENT
Start: 2018-11-02 | End: 2018-11-05 | Stop reason: SDUPTHER

## 2018-11-05 RX ORDER — RANOLAZINE 1000 MG/1
500 TABLET, EXTENDED RELEASE ORAL 2 TIMES DAILY
Qty: 60 TABLET | Refills: 3 | Status: SHIPPED | OUTPATIENT
Start: 2018-11-05 | End: 2018-11-05

## 2018-11-05 RX ORDER — RANOLAZINE 1000 MG/1
1000 TABLET, EXTENDED RELEASE ORAL 2 TIMES DAILY
Qty: 180 TABLET | Refills: 3 | Status: SHIPPED | OUTPATIENT
Start: 2018-11-05 | End: 2022-02-11 | Stop reason: SDUPTHER

## 2019-02-13 RX ORDER — AMLODIPINE BESYLATE 2.5 MG/1
TABLET ORAL
Qty: 90 TABLET | Refills: 3 | Status: ON HOLD | OUTPATIENT
Start: 2019-02-13 | End: 2019-03-25 | Stop reason: HOSPADM

## 2019-03-24 ENCOUNTER — HOSPITAL ENCOUNTER (OUTPATIENT)
Age: 49
Setting detail: OBSERVATION
Discharge: HOME | End: 2019-03-25
Attending: EMERGENCY MEDICINE | Admitting: INTERNAL MEDICINE
Payer: MEDICARE

## 2019-03-24 ENCOUNTER — APPOINTMENT (OUTPATIENT)
Dept: GENERAL RADIOLOGY | Age: 49
End: 2019-03-24
Payer: MEDICARE

## 2019-03-24 DIAGNOSIS — R07.9 CHEST PAIN, UNSPECIFIED TYPE: Primary | ICD-10-CM

## 2019-03-24 LAB
A/G RATIO: 1.5 (ref 1.1–2.2)
ALBUMIN SERPL-MCNC: 4 G/DL (ref 3.4–5)
ALP BLD-CCNC: 51 U/L (ref 40–129)
ALT SERPL-CCNC: 17 U/L (ref 10–40)
ANION GAP SERPL CALCULATED.3IONS-SCNC: 10 MMOL/L (ref 3–16)
AST SERPL-CCNC: 16 U/L (ref 15–37)
BASOPHILS ABSOLUTE: 0 K/UL (ref 0–0.2)
BASOPHILS RELATIVE PERCENT: 0.7 %
BILIRUB SERPL-MCNC: <0.2 MG/DL (ref 0–1)
BUN BLDV-MCNC: 16 MG/DL (ref 7–20)
CALCIUM SERPL-MCNC: 9.2 MG/DL (ref 8.3–10.6)
CHLORIDE BLD-SCNC: 102 MMOL/L (ref 99–110)
CHOLESTEROL, TOTAL: 222 MG/DL (ref 0–199)
CO2: 27 MMOL/L (ref 21–32)
CREAT SERPL-MCNC: 1 MG/DL (ref 0.6–1.1)
EKG ATRIAL RATE: 60 BPM
EKG ATRIAL RATE: 73 BPM
EKG DIAGNOSIS: NORMAL
EKG DIAGNOSIS: NORMAL
EKG P AXIS: 49 DEGREES
EKG P AXIS: 51 DEGREES
EKG P-R INTERVAL: 140 MS
EKG P-R INTERVAL: 148 MS
EKG Q-T INTERVAL: 386 MS
EKG Q-T INTERVAL: 406 MS
EKG QRS DURATION: 90 MS
EKG QRS DURATION: 92 MS
EKG QTC CALCULATION (BAZETT): 406 MS
EKG QTC CALCULATION (BAZETT): 425 MS
EKG R AXIS: -1 DEGREES
EKG R AXIS: 0 DEGREES
EKG T AXIS: 46 DEGREES
EKG T AXIS: 50 DEGREES
EKG VENTRICULAR RATE: 60 BPM
EKG VENTRICULAR RATE: 73 BPM
EOSINOPHILS ABSOLUTE: 0 K/UL (ref 0–0.6)
EOSINOPHILS RELATIVE PERCENT: 0.6 %
GFR AFRICAN AMERICAN: >60
GFR NON-AFRICAN AMERICAN: 58
GLOBULIN: 2.7 G/DL
GLUCOSE BLD-MCNC: 96 MG/DL (ref 70–99)
HCG QUALITATIVE: NEGATIVE
HCT VFR BLD CALC: 37.6 % (ref 36–48)
HDLC SERPL-MCNC: 49 MG/DL (ref 40–60)
HEMOGLOBIN: 12.8 G/DL (ref 12–16)
LDL CHOLESTEROL CALCULATED: 136 MG/DL
LYMPHOCYTES ABSOLUTE: 3 K/UL (ref 1–5.1)
LYMPHOCYTES RELATIVE PERCENT: 48.8 %
MCH RBC QN AUTO: 31.4 PG (ref 26–34)
MCHC RBC AUTO-ENTMCNC: 34.1 G/DL (ref 31–36)
MCV RBC AUTO: 92.1 FL (ref 80–100)
MONOCYTES ABSOLUTE: 0.4 K/UL (ref 0–1.3)
MONOCYTES RELATIVE PERCENT: 6.3 %
NEUTROPHILS ABSOLUTE: 2.7 K/UL (ref 1.7–7.7)
NEUTROPHILS RELATIVE PERCENT: 43.6 %
PDW BLD-RTO: 13.1 % (ref 12.4–15.4)
PLATELET # BLD: 303 K/UL (ref 135–450)
PMV BLD AUTO: 7.1 FL (ref 5–10.5)
POTASSIUM SERPL-SCNC: 3.6 MMOL/L (ref 3.5–5.1)
RBC # BLD: 4.08 M/UL (ref 4–5.2)
SODIUM BLD-SCNC: 139 MMOL/L (ref 136–145)
TOTAL PROTEIN: 6.7 G/DL (ref 6.4–8.2)
TRIGL SERPL-MCNC: 184 MG/DL (ref 0–150)
TROPONIN: <0.01 NG/ML
VLDLC SERPL CALC-MCNC: 37 MG/DL
WBC # BLD: 6.2 K/UL (ref 4–11)

## 2019-03-24 PROCEDURE — 99291 CRITICAL CARE FIRST HOUR: CPT

## 2019-03-24 PROCEDURE — 6370000000 HC RX 637 (ALT 250 FOR IP): Performed by: NURSE PRACTITIONER

## 2019-03-24 PROCEDURE — 6370000000 HC RX 637 (ALT 250 FOR IP): Performed by: INTERNAL MEDICINE

## 2019-03-24 PROCEDURE — 36415 COLL VENOUS BLD VENIPUNCTURE: CPT

## 2019-03-24 PROCEDURE — 93005 ELECTROCARDIOGRAM TRACING: CPT | Performed by: EMERGENCY MEDICINE

## 2019-03-24 PROCEDURE — 96372 THER/PROPH/DIAG INJ SC/IM: CPT

## 2019-03-24 PROCEDURE — 96374 THER/PROPH/DIAG INJ IV PUSH: CPT

## 2019-03-24 PROCEDURE — 93010 ELECTROCARDIOGRAM REPORT: CPT | Performed by: INTERNAL MEDICINE

## 2019-03-24 PROCEDURE — 6360000002 HC RX W HCPCS: Performed by: INTERNAL MEDICINE

## 2019-03-24 PROCEDURE — G0378 HOSPITAL OBSERVATION PER HR: HCPCS

## 2019-03-24 PROCEDURE — 85025 COMPLETE CBC W/AUTO DIFF WBC: CPT

## 2019-03-24 PROCEDURE — 2580000003 HC RX 258: Performed by: NURSE PRACTITIONER

## 2019-03-24 PROCEDURE — 96376 TX/PRO/DX INJ SAME DRUG ADON: CPT

## 2019-03-24 PROCEDURE — 71045 X-RAY EXAM CHEST 1 VIEW: CPT

## 2019-03-24 PROCEDURE — 93005 ELECTROCARDIOGRAM TRACING: CPT | Performed by: NURSE PRACTITIONER

## 2019-03-24 PROCEDURE — 6370000000 HC RX 637 (ALT 250 FOR IP): Performed by: EMERGENCY MEDICINE

## 2019-03-24 PROCEDURE — 84703 CHORIONIC GONADOTROPIN ASSAY: CPT

## 2019-03-24 PROCEDURE — 80061 LIPID PANEL: CPT

## 2019-03-24 PROCEDURE — 96375 TX/PRO/DX INJ NEW DRUG ADDON: CPT

## 2019-03-24 PROCEDURE — 6360000002 HC RX W HCPCS: Performed by: EMERGENCY MEDICINE

## 2019-03-24 PROCEDURE — 84484 ASSAY OF TROPONIN QUANT: CPT

## 2019-03-24 PROCEDURE — 6360000002 HC RX W HCPCS: Performed by: NURSE PRACTITIONER

## 2019-03-24 PROCEDURE — 80053 COMPREHEN METABOLIC PANEL: CPT

## 2019-03-24 RX ORDER — PRAVASTATIN SODIUM 20 MG
10 TABLET ORAL NIGHTLY
Status: DISCONTINUED | OUTPATIENT
Start: 2019-03-24 | End: 2019-03-26 | Stop reason: HOSPADM

## 2019-03-24 RX ORDER — ASPIRIN 81 MG/1
81 TABLET, CHEWABLE ORAL DAILY
Status: DISCONTINUED | OUTPATIENT
Start: 2019-03-25 | End: 2019-03-24

## 2019-03-24 RX ORDER — RANOLAZINE 500 MG/1
1000 TABLET, EXTENDED RELEASE ORAL 2 TIMES DAILY
Status: DISCONTINUED | OUTPATIENT
Start: 2019-03-24 | End: 2019-03-24

## 2019-03-24 RX ORDER — ONDANSETRON 2 MG/ML
4 INJECTION INTRAMUSCULAR; INTRAVENOUS ONCE
Status: COMPLETED | OUTPATIENT
Start: 2019-03-24 | End: 2019-03-24

## 2019-03-24 RX ORDER — LEVOTHYROXINE SODIUM 0.03 MG/1
25 TABLET ORAL
Status: DISCONTINUED | OUTPATIENT
Start: 2019-03-25 | End: 2019-03-26 | Stop reason: HOSPADM

## 2019-03-24 RX ORDER — ASPIRIN 81 MG/1
324 TABLET, CHEWABLE ORAL ONCE
Status: COMPLETED | OUTPATIENT
Start: 2019-03-24 | End: 2019-03-24

## 2019-03-24 RX ORDER — CALCIUM CARBONATE 200(500)MG
500 TABLET,CHEWABLE ORAL 3 TIMES DAILY PRN
Status: DISCONTINUED | OUTPATIENT
Start: 2019-03-24 | End: 2019-03-26 | Stop reason: HOSPADM

## 2019-03-24 RX ORDER — MORPHINE SULFATE 2 MG/ML
2 INJECTION, SOLUTION INTRAMUSCULAR; INTRAVENOUS EVERY 4 HOURS PRN
Status: DISCONTINUED | OUTPATIENT
Start: 2019-03-24 | End: 2019-03-26 | Stop reason: HOSPADM

## 2019-03-24 RX ORDER — MORPHINE SULFATE 15 MG/1
15 TABLET, FILM COATED, EXTENDED RELEASE ORAL 2 TIMES DAILY
Status: DISCONTINUED | OUTPATIENT
Start: 2019-03-24 | End: 2019-03-26 | Stop reason: HOSPADM

## 2019-03-24 RX ORDER — FLUOXETINE HYDROCHLORIDE 20 MG/1
20 CAPSULE ORAL NIGHTLY
Status: DISCONTINUED | OUTPATIENT
Start: 2019-03-25 | End: 2019-03-26 | Stop reason: HOSPADM

## 2019-03-24 RX ORDER — ASPIRIN 81 MG/1
81 TABLET, CHEWABLE ORAL DAILY
Status: DISCONTINUED | OUTPATIENT
Start: 2019-03-24 | End: 2019-03-26 | Stop reason: HOSPADM

## 2019-03-24 RX ORDER — MORPHINE SULFATE 4 MG/ML
4 INJECTION, SOLUTION INTRAMUSCULAR; INTRAVENOUS ONCE
Status: COMPLETED | OUTPATIENT
Start: 2019-03-24 | End: 2019-03-24

## 2019-03-24 RX ORDER — AMLODIPINE BESYLATE 2.5 MG/1
2.5 TABLET ORAL NIGHTLY
Status: DISCONTINUED | OUTPATIENT
Start: 2019-03-25 | End: 2019-03-26 | Stop reason: HOSPADM

## 2019-03-24 RX ORDER — MORPHINE SULFATE 2 MG/ML
2 INJECTION, SOLUTION INTRAMUSCULAR; INTRAVENOUS ONCE
Status: COMPLETED | OUTPATIENT
Start: 2019-03-24 | End: 2019-03-24

## 2019-03-24 RX ORDER — BUTALBITAL, ACETAMINOPHEN AND CAFFEINE 50; 325; 40 MG/1; MG/1; MG/1
2 TABLET ORAL ONCE
Status: DISCONTINUED | OUTPATIENT
Start: 2019-03-24 | End: 2019-03-26 | Stop reason: HOSPADM

## 2019-03-24 RX ORDER — SODIUM CHLORIDE 0.9 % (FLUSH) 0.9 %
10 SYRINGE (ML) INJECTION EVERY 12 HOURS SCHEDULED
Status: DISCONTINUED | OUTPATIENT
Start: 2019-03-24 | End: 2019-03-26 | Stop reason: HOSPADM

## 2019-03-24 RX ORDER — MORPHINE SULFATE 4 MG/ML
INJECTION, SOLUTION INTRAMUSCULAR; INTRAVENOUS
Status: DISPENSED
Start: 2019-03-24 | End: 2019-03-24

## 2019-03-24 RX ORDER — SODIUM CHLORIDE 0.9 % (FLUSH) 0.9 %
10 SYRINGE (ML) INJECTION PRN
Status: DISCONTINUED | OUTPATIENT
Start: 2019-03-24 | End: 2019-03-26 | Stop reason: HOSPADM

## 2019-03-24 RX ORDER — AMLODIPINE BESYLATE 2.5 MG/1
2.5 TABLET ORAL DAILY
Status: DISCONTINUED | OUTPATIENT
Start: 2019-03-24 | End: 2019-03-24

## 2019-03-24 RX ORDER — ONDANSETRON 2 MG/ML
4 INJECTION INTRAMUSCULAR; INTRAVENOUS EVERY 6 HOURS PRN
Status: DISCONTINUED | OUTPATIENT
Start: 2019-03-24 | End: 2019-03-26 | Stop reason: HOSPADM

## 2019-03-24 RX ORDER — NITROGLYCERIN 0.4 MG/1
0.4 TABLET SUBLINGUAL EVERY 5 MIN PRN
Status: DISCONTINUED | OUTPATIENT
Start: 2019-03-24 | End: 2019-03-26 | Stop reason: HOSPADM

## 2019-03-24 RX ORDER — PREGABALIN 25 MG/1
50 CAPSULE ORAL NIGHTLY
Status: DISCONTINUED | OUTPATIENT
Start: 2019-03-25 | End: 2019-03-26 | Stop reason: HOSPADM

## 2019-03-24 RX ORDER — LEVOTHYROXINE SODIUM 0.03 MG/1
25 TABLET ORAL DAILY
Status: DISCONTINUED | OUTPATIENT
Start: 2019-03-24 | End: 2019-03-24

## 2019-03-24 RX ORDER — PANTOPRAZOLE SODIUM 40 MG/1
40 TABLET, DELAYED RELEASE ORAL
Status: DISCONTINUED | OUTPATIENT
Start: 2019-03-24 | End: 2019-03-26 | Stop reason: HOSPADM

## 2019-03-24 RX ORDER — ACETAMINOPHEN 325 MG/1
650 TABLET ORAL EVERY 4 HOURS PRN
Status: DISCONTINUED | OUTPATIENT
Start: 2019-03-24 | End: 2019-03-26 | Stop reason: HOSPADM

## 2019-03-24 RX ORDER — PREGABALIN 25 MG/1
50 CAPSULE ORAL DAILY
Status: DISCONTINUED | OUTPATIENT
Start: 2019-03-24 | End: 2019-03-24

## 2019-03-24 RX ORDER — DOBUTAMINE HYDROCHLORIDE 200 MG/100ML
10 INJECTION INTRAVENOUS CONTINUOUS
Status: DISCONTINUED | OUTPATIENT
Start: 2019-03-24 | End: 2019-03-25

## 2019-03-24 RX ORDER — FLUOXETINE HYDROCHLORIDE 20 MG/1
20 CAPSULE ORAL DAILY
Status: DISCONTINUED | OUTPATIENT
Start: 2019-03-24 | End: 2019-03-24

## 2019-03-24 RX ADMIN — NITROGLYCERIN 1 INCH: 20 OINTMENT TOPICAL at 02:34

## 2019-03-24 RX ADMIN — MORPHINE SULFATE 2 MG: 2 INJECTION, SOLUTION INTRAMUSCULAR; INTRAVENOUS at 09:54

## 2019-03-24 RX ADMIN — RANOLAZINE 1000 MG: 500 TABLET, FILM COATED, EXTENDED RELEASE ORAL at 07:47

## 2019-03-24 RX ADMIN — ENOXAPARIN SODIUM 40 MG: 40 INJECTION SUBCUTANEOUS at 07:48

## 2019-03-24 RX ADMIN — PANTOPRAZOLE SODIUM 40 MG: 40 TABLET, DELAYED RELEASE ORAL at 06:38

## 2019-03-24 RX ADMIN — MORPHINE SULFATE 15 MG: 15 TABLET, FILM COATED, EXTENDED RELEASE ORAL at 21:27

## 2019-03-24 RX ADMIN — ONDANSETRON 4 MG: 2 INJECTION INTRAMUSCULAR; INTRAVENOUS at 02:34

## 2019-03-24 RX ADMIN — FLUOXETINE 20 MG: 20 CAPSULE ORAL at 07:47

## 2019-03-24 RX ADMIN — ONDANSETRON 4 MG: 2 INJECTION INTRAMUSCULAR; INTRAVENOUS at 09:56

## 2019-03-24 RX ADMIN — ACETAMINOPHEN 325 MG: 325 TABLET ORAL at 06:39

## 2019-03-24 RX ADMIN — MORPHINE SULFATE 4 MG: 4 INJECTION INTRAVENOUS at 02:34

## 2019-03-24 RX ADMIN — MORPHINE SULFATE 15 MG: 15 TABLET, FILM COATED, EXTENDED RELEASE ORAL at 10:37

## 2019-03-24 RX ADMIN — PRAVASTATIN SODIUM 10 MG: 20 TABLET ORAL at 21:28

## 2019-03-24 RX ADMIN — SODIUM CHLORIDE, PRESERVATIVE FREE 10 ML: 5 INJECTION INTRAVENOUS at 21:29

## 2019-03-24 RX ADMIN — ACETAMINOPHEN 650 MG: 325 TABLET ORAL at 21:27

## 2019-03-24 RX ADMIN — ONDANSETRON 4 MG: 2 INJECTION INTRAMUSCULAR; INTRAVENOUS at 14:48

## 2019-03-24 RX ADMIN — ASPIRIN 81 MG 81 MG: 81 TABLET ORAL at 07:48

## 2019-03-24 RX ADMIN — PREGABALIN 50 MG: 25 CAPSULE ORAL at 09:54

## 2019-03-24 RX ADMIN — SODIUM CHLORIDE, PRESERVATIVE FREE 10 ML: 5 INJECTION INTRAVENOUS at 07:48

## 2019-03-24 RX ADMIN — MORPHINE SULFATE 4 MG: 4 INJECTION INTRAVENOUS at 04:21

## 2019-03-24 RX ADMIN — LEVOTHYROXINE SODIUM 25 MCG: 25 TABLET ORAL at 07:47

## 2019-03-24 RX ADMIN — ASPIRIN 81 MG 324 MG: 81 TABLET ORAL at 02:34

## 2019-03-24 RX ADMIN — AMLODIPINE BESYLATE 2.5 MG: 2.5 TABLET ORAL at 07:47

## 2019-03-24 ASSESSMENT — PAIN SCALES - GENERAL
PAINLEVEL_OUTOF10: 4
PAINLEVEL_OUTOF10: 7
PAINLEVEL_OUTOF10: 6
PAINLEVEL_OUTOF10: 4
PAINLEVEL_OUTOF10: 5
PAINLEVEL_OUTOF10: 7
PAINLEVEL_OUTOF10: 2
PAINLEVEL_OUTOF10: 5
PAINLEVEL_OUTOF10: 4

## 2019-03-24 ASSESSMENT — PAIN DESCRIPTION - ORIENTATION
ORIENTATION: LEFT;UPPER
ORIENTATION: LEFT
ORIENTATION: LEFT
ORIENTATION: LEFT;UPPER

## 2019-03-24 ASSESSMENT — PAIN DESCRIPTION - PAIN TYPE
TYPE: ACUTE PAIN

## 2019-03-24 ASSESSMENT — PAIN DESCRIPTION - DESCRIPTORS
DESCRIPTORS: CONSTANT
DESCRIPTORS: CONSTANT;SHARP;SQUEEZING

## 2019-03-24 ASSESSMENT — PAIN DESCRIPTION - LOCATION
LOCATION: CHEST
LOCATION: CHEST;SHOULDER
LOCATION: CHEST

## 2019-03-24 ASSESSMENT — PAIN DESCRIPTION - ONSET
ONSET: ON-GOING
ONSET: ON-GOING

## 2019-03-24 ASSESSMENT — PAIN DESCRIPTION - FREQUENCY
FREQUENCY: CONTINUOUS
FREQUENCY: INTERMITTENT

## 2019-03-24 ASSESSMENT — HEART SCORE: ECG: 0

## 2019-03-24 ASSESSMENT — PAIN DESCRIPTION - PROGRESSION
CLINICAL_PROGRESSION: GRADUALLY WORSENING
CLINICAL_PROGRESSION: NOT CHANGED

## 2019-03-25 ENCOUNTER — APPOINTMENT (OUTPATIENT)
Dept: NUCLEAR MEDICINE | Age: 49
End: 2019-03-25
Payer: MEDICARE

## 2019-03-25 VITALS
HEART RATE: 68 BPM | WEIGHT: 159.9 LBS | BODY MASS INDEX: 31.39 KG/M2 | SYSTOLIC BLOOD PRESSURE: 94 MMHG | HEIGHT: 60 IN | RESPIRATION RATE: 18 BRPM | OXYGEN SATURATION: 96 % | TEMPERATURE: 98.3 F | DIASTOLIC BLOOD PRESSURE: 45 MMHG

## 2019-03-25 LAB
LV EF: 60 %
LVEF MODALITY: NORMAL

## 2019-03-25 PROCEDURE — 2580000003 HC RX 258: Performed by: NURSE PRACTITIONER

## 2019-03-25 PROCEDURE — 6370000000 HC RX 637 (ALT 250 FOR IP): Performed by: INTERNAL MEDICINE

## 2019-03-25 PROCEDURE — G0378 HOSPITAL OBSERVATION PER HR: HCPCS

## 2019-03-25 PROCEDURE — 93017 CV STRESS TEST TRACING ONLY: CPT

## 2019-03-25 PROCEDURE — 6360000002 HC RX W HCPCS: Performed by: NURSE PRACTITIONER

## 2019-03-25 PROCEDURE — 78452 HT MUSCLE IMAGE SPECT MULT: CPT

## 2019-03-25 PROCEDURE — 6370000000 HC RX 637 (ALT 250 FOR IP): Performed by: NURSE PRACTITIONER

## 2019-03-25 PROCEDURE — A9502 TC99M TETROFOSMIN: HCPCS | Performed by: INTERNAL MEDICINE

## 2019-03-25 PROCEDURE — 96376 TX/PRO/DX INJ SAME DRUG ADON: CPT

## 2019-03-25 PROCEDURE — 3430000000 HC RX DIAGNOSTIC RADIOPHARMACEUTICAL: Performed by: INTERNAL MEDICINE

## 2019-03-25 RX ADMIN — ONDANSETRON 4 MG: 2 INJECTION INTRAMUSCULAR; INTRAVENOUS at 13:00

## 2019-03-25 RX ADMIN — MORPHINE SULFATE 15 MG: 15 TABLET, FILM COATED, EXTENDED RELEASE ORAL at 09:03

## 2019-03-25 RX ADMIN — MORPHINE SULFATE 2 MG: 2 INJECTION, SOLUTION INTRAMUSCULAR; INTRAVENOUS at 00:05

## 2019-03-25 RX ADMIN — TETROFOSMIN 31 MILLICURIE: 0.23 INJECTION, POWDER, LYOPHILIZED, FOR SOLUTION INTRAVENOUS at 11:05

## 2019-03-25 RX ADMIN — ASPIRIN 81 MG 81 MG: 81 TABLET ORAL at 09:03

## 2019-03-25 RX ADMIN — PANTOPRAZOLE SODIUM 40 MG: 40 TABLET, DELAYED RELEASE ORAL at 05:04

## 2019-03-25 RX ADMIN — LEVOTHYROXINE SODIUM 25 MCG: 25 TABLET ORAL at 05:04

## 2019-03-25 RX ADMIN — REGADENOSON 0.4 MG: 0.08 INJECTION, SOLUTION INTRAVENOUS at 11:05

## 2019-03-25 RX ADMIN — SODIUM CHLORIDE, PRESERVATIVE FREE 10 ML: 5 INJECTION INTRAVENOUS at 09:04

## 2019-03-25 RX ADMIN — ACETAMINOPHEN 650 MG: 325 TABLET ORAL at 13:06

## 2019-03-25 RX ADMIN — TETROFOSMIN 10.9 MILLICURIE: 0.23 INJECTION, POWDER, LYOPHILIZED, FOR SOLUTION INTRAVENOUS at 10:06

## 2019-03-25 ASSESSMENT — PAIN DESCRIPTION - ORIENTATION
ORIENTATION: LEFT;MID
ORIENTATION: LEFT;MID

## 2019-03-25 ASSESSMENT — PAIN DESCRIPTION - PROGRESSION
CLINICAL_PROGRESSION: NOT CHANGED

## 2019-03-25 ASSESSMENT — PAIN SCALES - GENERAL
PAINLEVEL_OUTOF10: 5
PAINLEVEL_OUTOF10: 0
PAINLEVEL_OUTOF10: 4
PAINLEVEL_OUTOF10: 5
PAINLEVEL_OUTOF10: 6
PAINLEVEL_OUTOF10: 4

## 2019-03-25 ASSESSMENT — PAIN DESCRIPTION - PAIN TYPE
TYPE: ACUTE PAIN

## 2019-03-25 ASSESSMENT — PAIN DESCRIPTION - ONSET: ONSET: ON-GOING

## 2019-03-25 ASSESSMENT — PAIN DESCRIPTION - LOCATION
LOCATION: HEAD
LOCATION: CHEST;SHOULDER
LOCATION: CHEST;SHOULDER

## 2019-03-25 ASSESSMENT — PAIN DESCRIPTION - DESCRIPTORS
DESCRIPTORS: ACHING;CONSTANT;SHARP;SQUEEZING
DESCRIPTORS: ACHING

## 2019-03-25 ASSESSMENT — PAIN DESCRIPTION - FREQUENCY: FREQUENCY: CONTINUOUS

## 2019-09-13 RX ORDER — PRAVASTATIN SODIUM 10 MG
TABLET ORAL
Qty: 45 TABLET | Refills: 1 | Status: SHIPPED | OUTPATIENT
Start: 2019-09-13 | End: 2020-03-10

## 2020-01-17 RX ORDER — NITROGLYCERIN 0.4 MG/1
TABLET SUBLINGUAL
Qty: 25 TABLET | Refills: 2 | Status: SHIPPED | OUTPATIENT
Start: 2020-01-17 | End: 2022-05-23 | Stop reason: SDUPTHER

## 2020-01-17 NOTE — TELEPHONE ENCOUNTER
10/9/18      Plan:  1. Labs - Lipids  2. Take 10 mg of pravastatin  3.  Follow up with Dr. Ayaka Swenson in 1 year

## 2020-03-10 ENCOUNTER — TELEPHONE (OUTPATIENT)
Dept: CARDIOLOGY CLINIC | Age: 50
End: 2020-03-10

## 2020-03-10 RX ORDER — PRAVASTATIN SODIUM 10 MG
TABLET ORAL
Qty: 45 TABLET | Refills: 0 | Status: SHIPPED | OUTPATIENT
Start: 2020-03-10 | End: 2020-06-03

## 2020-03-20 ENCOUNTER — APPOINTMENT (OUTPATIENT)
Dept: CT IMAGING | Age: 50
End: 2020-03-20
Payer: MEDICARE

## 2020-03-20 ENCOUNTER — HOSPITAL ENCOUNTER (EMERGENCY)
Age: 50
Discharge: HOME | End: 2020-03-20
Payer: MEDICARE

## 2020-03-20 VITALS
SYSTOLIC BLOOD PRESSURE: 124 MMHG | TEMPERATURE: 98.3 F | DIASTOLIC BLOOD PRESSURE: 73 MMHG | OXYGEN SATURATION: 98 % | HEART RATE: 72 BPM | RESPIRATION RATE: 15 BRPM

## 2020-03-20 LAB
A/G RATIO: 1.4 (ref 1.1–2.2)
ALBUMIN SERPL-MCNC: 4.1 G/DL (ref 3.4–5)
ALP BLD-CCNC: 66 U/L (ref 40–129)
ALT SERPL-CCNC: 13 U/L (ref 10–40)
ANION GAP SERPL CALCULATED.3IONS-SCNC: 10 MMOL/L (ref 3–16)
AST SERPL-CCNC: 13 U/L (ref 15–37)
BASOPHILS ABSOLUTE: 0.1 K/UL (ref 0–0.2)
BASOPHILS RELATIVE PERCENT: 1 %
BILIRUB SERPL-MCNC: <0.2 MG/DL (ref 0–1)
BUN BLDV-MCNC: 18 MG/DL (ref 7–20)
CALCIUM SERPL-MCNC: 9.2 MG/DL (ref 8.3–10.6)
CHLORIDE BLD-SCNC: 99 MMOL/L (ref 99–110)
CO2: 27 MMOL/L (ref 21–32)
CREAT SERPL-MCNC: 0.9 MG/DL (ref 0.6–1.1)
EOSINOPHILS ABSOLUTE: 0.1 K/UL (ref 0–0.6)
EOSINOPHILS RELATIVE PERCENT: 1.2 %
GFR AFRICAN AMERICAN: >60
GFR NON-AFRICAN AMERICAN: >60
GLOBULIN: 2.9 G/DL
GLUCOSE BLD-MCNC: 117 MG/DL (ref 70–99)
HCT VFR BLD CALC: 39.2 % (ref 36–48)
HEMOGLOBIN: 13.3 G/DL (ref 12–16)
LYMPHOCYTES ABSOLUTE: 2.9 K/UL (ref 1–5.1)
LYMPHOCYTES RELATIVE PERCENT: 35.8 %
MCH RBC QN AUTO: 31 PG (ref 26–34)
MCHC RBC AUTO-ENTMCNC: 34 G/DL (ref 31–36)
MCV RBC AUTO: 91.2 FL (ref 80–100)
MONOCYTES ABSOLUTE: 0.5 K/UL (ref 0–1.3)
MONOCYTES RELATIVE PERCENT: 6.3 %
NEUTROPHILS ABSOLUTE: 4.5 K/UL (ref 1.7–7.7)
NEUTROPHILS RELATIVE PERCENT: 55.7 %
PDW BLD-RTO: 13 % (ref 12.4–15.4)
PLATELET # BLD: 364 K/UL (ref 135–450)
PMV BLD AUTO: 6.8 FL (ref 5–10.5)
POTASSIUM REFLEX MAGNESIUM: 3.9 MMOL/L (ref 3.5–5.1)
RBC # BLD: 4.3 M/UL (ref 4–5.2)
SODIUM BLD-SCNC: 136 MMOL/L (ref 136–145)
TOTAL PROTEIN: 7 G/DL (ref 6.4–8.2)
WBC # BLD: 8 K/UL (ref 4–11)

## 2020-03-20 PROCEDURE — 74177 CT ABD & PELVIS W/CONTRAST: CPT

## 2020-03-20 PROCEDURE — 6370000000 HC RX 637 (ALT 250 FOR IP): Performed by: NURSE PRACTITIONER

## 2020-03-20 PROCEDURE — 99284 EMERGENCY DEPT VISIT MOD MDM: CPT

## 2020-03-20 PROCEDURE — 85025 COMPLETE CBC W/AUTO DIFF WBC: CPT

## 2020-03-20 PROCEDURE — 6360000004 HC RX CONTRAST MEDICATION: Performed by: NURSE PRACTITIONER

## 2020-03-20 PROCEDURE — 80053 COMPREHEN METABOLIC PANEL: CPT

## 2020-03-20 RX ORDER — SULFAMETHOXAZOLE AND TRIMETHOPRIM 800; 160 MG/1; MG/1
1 TABLET ORAL ONCE
Status: COMPLETED | OUTPATIENT
Start: 2020-03-20 | End: 2020-03-20

## 2020-03-20 RX ORDER — SULFAMETHOXAZOLE AND TRIMETHOPRIM 800; 160 MG/1; MG/1
1 TABLET ORAL 2 TIMES DAILY
Qty: 20 TABLET | Refills: 0 | Status: SHIPPED | OUTPATIENT
Start: 2020-03-20 | End: 2020-03-30

## 2020-03-20 RX ADMIN — SULFAMETHOXAZOLE AND TRIMETHOPRIM 1 TABLET: 800; 160 TABLET ORAL at 22:24

## 2020-03-20 RX ADMIN — IOPAMIDOL 75 ML: 755 INJECTION, SOLUTION INTRAVENOUS at 21:45

## 2020-03-20 ASSESSMENT — PAIN DESCRIPTION - DESCRIPTORS: DESCRIPTORS: CONSTANT

## 2020-03-20 ASSESSMENT — PAIN DESCRIPTION - PAIN TYPE: TYPE: ACUTE PAIN

## 2020-03-20 ASSESSMENT — PAIN DESCRIPTION - LOCATION: LOCATION: PELVIS

## 2020-03-20 ASSESSMENT — PAIN SCALES - WONG BAKER: WONGBAKER_NUMERICALRESPONSE: 6

## 2020-03-20 ASSESSMENT — PAIN SCALES - GENERAL
PAINLEVEL_OUTOF10: 6
PAINLEVEL_OUTOF10: 6

## 2020-03-20 ASSESSMENT — PAIN - FUNCTIONAL ASSESSMENT: PAIN_FUNCTIONAL_ASSESSMENT: PREVENTS OR INTERFERES SOME ACTIVE ACTIVITIES AND ADLS

## 2020-03-20 ASSESSMENT — PAIN DESCRIPTION - ONSET: ONSET: ON-GOING

## 2020-03-20 ASSESSMENT — PAIN DESCRIPTION - PROGRESSION: CLINICAL_PROGRESSION: GRADUALLY WORSENING

## 2020-03-20 ASSESSMENT — PAIN DESCRIPTION - FREQUENCY: FREQUENCY: CONTINUOUS

## 2020-03-21 NOTE — ED PROVIDER NOTES
Agitated      Neurontin [Gabapentin] Other (See Comments)     Drowsiness     Other      Surgical glue    Hydrocodone-Acetaminophen Nausea And Vomiting    Hydromorphone Nausea And Vomiting       FAMILY HISTORY    Family History   Problem Relation Age of Onset    Heart Disease Mother     Kidney Disease Mother     Stroke Father     Cancer Father     Heart Disease Father     Heart Disease Brother        SOCIAL HISTORY    Social History     Socioeconomic History    Marital status:      Spouse name: None    Number of children: None    Years of education: None    Highest education level: None   Occupational History    None   Social Needs    Financial resource strain: None    Food insecurity     Worry: None     Inability: None    Transportation needs     Medical: None     Non-medical: None   Tobacco Use    Smoking status: Never Smoker    Smokeless tobacco: Never Used   Substance and Sexual Activity    Alcohol use: No     Alcohol/week: 0.0 standard drinks     Comment: occ    Drug use: No    Sexual activity: Yes     Partners: Male   Lifestyle    Physical activity     Days per week: None     Minutes per session: None    Stress: None   Relationships    Social connections     Talks on phone: None     Gets together: None     Attends Anabaptist service: None     Active member of club or organization: None     Attends meetings of clubs or organizations: None     Relationship status: None    Intimate partner violence     Fear of current or ex partner: None     Emotionally abused: None     Physically abused: None     Forced sexual activity: None   Other Topics Concern    None   Social History Narrative    None       REVIEW OFSYSTEMS    10systems reviewed, pertinent positives per HPI otherwise noted to be negative.     PHYSICAL EXAM  Physical Exam  Vitals:    03/20/20 2226   BP: 124/73   Pulse: 72   Resp: 15   Temp: 98.3 °F (36.8 °C)   SpO2: 98%       GENERAL: Patient is well-developed, Basophils % 1.0 %    Neutrophils Absolute 4.5 1.7 - 7.7 K/uL    Lymphocytes Absolute 2.9 1.0 - 5.1 K/uL    Monocytes Absolute 0.5 0.0 - 1.3 K/uL    Eosinophils Absolute 0.1 0.0 - 0.6 K/uL    Basophils Absolute 0.1 0.0 - 0.2 K/uL   Comprehensive Metabolic Panel w/ Reflex to MG   Result Value Ref Range    Sodium 136 136 - 145 mmol/L    Potassium reflex Magnesium 3.9 3.5 - 5.1 mmol/L    Chloride 99 99 - 110 mmol/L    CO2 27 21 - 32 mmol/L    Anion Gap 10 3 - 16    Glucose 117 (H) 70 - 99 mg/dL    BUN 18 7 - 20 mg/dL    CREATININE 0.9 0.6 - 1.1 mg/dL    GFR Non-African American >60 >60    GFR African American >60 >60    Calcium 9.2 8.3 - 10.6 mg/dL    Total Protein 7.0 6.4 - 8.2 g/dL    Alb 4.1 3.4 - 5.0 g/dL    Albumin/Globulin Ratio 1.4 1.1 - 2.2    Total Bilirubin <0.2 0.0 - 1.0 mg/dL    Alkaline Phosphatase 66 40 - 129 U/L    ALT 13 10 - 40 U/L    AST 13 (L) 15 - 37 U/L    Globulin 2.9 g/dL       RADIOLOGY    Ct Abdomen Pelvis W Iv Contrast Additional Contrast? None    Result Date: 3/20/2020  EXAMINATION: CT OF THE ABDOMEN AND PELVIS WITH CONTRAST 3/20/2020 9:38 pm TECHNIQUE: CT of the abdomen and pelvis was performed with the administration of intravenous contrast. Multiplanar reformatted images are provided for review. Dose modulation, iterative reconstruction, and/or weight based adjustment of the mA/kV was utilized to reduce the radiation dose to as low as reasonably achievable. COMPARISON: 06/16/2017 HISTORY: ORDERING SYSTEM PROVIDED HISTORY: LLQ pain, h/o diverticulitis, ingrown pubic hairs TECHNOLOGIST PROVIDED HISTORY: Reason for exam:->LLQ pain, h/o diverticulitis, ingrown pubic hairs Additional Contrast?->None Reason for Exam: left side abd pain x 1 day Acuity: Acute Type of Exam: Initial Additional signs and symptoms: nausea Relevant Medical/Surgical History: hyst, adhesions FINDINGS: Lower Chest: The visualized lung bases are clear. Organs:  The liver, gallbladder, pancreas, spleen, adrenals and kidneys reveal no acute findings. Tiny hypoattenuating liver lesions are unchanged, likely representing benign cysts. Subcentimeter hypoattenuating right renal lesion, consistent with a cyst. GI/Bowel: There is no bowel dilatation or wall thickening identified. Moderate stool burden. Pelvis: No acute findings. Peritoneum/Retroperitoneum: No free air or free fluid. The aorta is normal in caliber. The visceral branches are patent. No lymphadenopathy. Bones/Soft Tissues: No abnormality identified. No acute findings identified. ED COURSE/MDM  Patient seen and evaluated. Old records reviewed. Diagnostic testing reviewed and results discussed. I have evaluated this patient. My supervising physician was available for consultation. Juvenal presented to the ED today with above noted complaints. Physical exam does reveal left lower quadrant abdominal tenderness to palpation and a left labia majora abscess. CBC without evidence of systemic infection. No anemia. CMP without electrolyte abnormality. No kidney or liver dysfunction. CT abdomen pelvis obtained and without acute findings. I discussed with the patient performing an I&D to the abscess versus adding Bactrim to her current antibiotic regimen and using warm compresses to see if this will help her pain resolve, patient declined I&D at this time and would like to try the conservative treatment. Pt was given the following medications or treatments in the ED:   Medications   iopamidol (ISOVUE-370) 76 % injection 75 mL (75 mLs Intravenous Given 3/20/20 2145)   sulfamethoxazole-trimethoprim (BACTRIM DS;SEPTRA DS) 800-160 MG per tablet 1 tablet (1 tablet Oral Given 3/20/20 2224)       At this point I do not feel the patient requires further work upand it is reasonable to discharge the patient. Please refer to AVS for further details regarding discharge instructions.     A discussion was had with the patient regarding diagnosis, diagnostic testing results,treatment/ plan of care, and follow up. All questions were answered. Patient will follow up as directed for further evaluation/treatment. The patient was given strict return precautions as we discussed symptoms that wouldnecessitate return to the ED. Patient will return to ED for new/worsening symptoms. The patient verbalized their understanding and agreement with the above plan. Patient was sent home with a prescription for below medication/s. I did Craig patient on appropriate use of these medication. Discharge Medication List as of 3/20/2020 10:20 PM      START taking these medications    Details   sulfamethoxazole-trimethoprim (BACTRIM DS) 800-160 MG per tablet Take 1 tablet by mouth 2 times daily for 10 days, Disp-20 tablet, R-0Print             I estimatethere is LOW risk for ACUTE APPENDICITIS, BOWEL OBSTRUCTION, CHOLECYSTITIS, DIVERTICULITIS, INCARCERATED HERNIA, PANCREATITIS, or PERFORATED BOWEL or ULCER, thus I consider the discharge disposition reasonable. Also, thereis no evidence or peritonitis, sepsis, or toxicity. Elza Pérez and I have discussed the diagnosis and risks, and we agree with discharging home to follow-up with their primary doctor. We also discussed returning to the EmergencyDepartment immediately if new or worsening symptoms occur. We have discussed the symptoms which are most concerning (e.g., bloody stool, fever, changing or worsening pain, vomiting) that necessitate immediate return. CLINICAL IMPRESSION    1. Abscess    2. Left lower quadrant abdominal pain           Discharge Vitals:  Blood pressure 124/73, pulse 72, temperature 98.3 °F (36.8 °C), temperature source Oral, resp. rate 15, SpO2 98 %, not currently breastfeeding.     FOLLOW UP  Salbador Cranker, MD  [address] [place] [address]  [phone]    Schedule an appointment as soon as possible for a visit in 3 days  For further evaluation    [facility]  ED  [address] [facility] [id number] [phone]  Go to   If symptoms worsen      DISPOSITION  Patient was discharged to home in good condition. Comment: Pleasenote this report has been produced using speech recognition software and may contain errors related to that system including errors in grammar, punctuation, and spelling, as well as words and phrases that may beinappropriate. If there are any questions or concerns please feel free to contact the dictating provider for clarification.        ADARSH Anthony - CNP  03/20/20 2069

## 2020-04-16 RX ORDER — AMLODIPINE BESYLATE 2.5 MG/1
TABLET ORAL
Qty: 90 TABLET | Refills: 0 | Status: SHIPPED | OUTPATIENT
Start: 2020-04-16 | End: 2020-07-14

## 2020-04-16 NOTE — TELEPHONE ENCOUNTER
LOV 10/09/2018 w/ SALVADOR    Plan:  1. Labs - Lipids  2. Increase pravastatin to 10 mg  3.  Follow up with me in 1 year       -pt has f/u appt 06/05/2020  W/ Adriana Andrade

## 2020-06-03 RX ORDER — PRAVASTATIN SODIUM 10 MG
TABLET ORAL
Qty: 45 TABLET | Refills: 0 | Status: SHIPPED | OUTPATIENT
Start: 2020-06-03 | End: 2020-08-25

## 2020-07-14 ENCOUNTER — OFFICE VISIT (OUTPATIENT)
Dept: CARDIOLOGY CLINIC | Age: 50
End: 2020-07-14
Payer: MEDICARE

## 2020-07-14 VITALS
OXYGEN SATURATION: 98 % | WEIGHT: 168.2 LBS | SYSTOLIC BLOOD PRESSURE: 114 MMHG | BODY MASS INDEX: 33.02 KG/M2 | HEART RATE: 69 BPM | DIASTOLIC BLOOD PRESSURE: 70 MMHG | HEIGHT: 60 IN

## 2020-07-14 PROCEDURE — 3017F COLORECTAL CA SCREEN DOC REV: CPT | Performed by: INTERNAL MEDICINE

## 2020-07-14 PROCEDURE — 99213 OFFICE O/P EST LOW 20 MIN: CPT | Performed by: INTERNAL MEDICINE

## 2020-07-14 PROCEDURE — G8417 CALC BMI ABV UP PARAM F/U: HCPCS | Performed by: INTERNAL MEDICINE

## 2020-07-14 PROCEDURE — G8427 DOCREV CUR MEDS BY ELIG CLIN: HCPCS | Performed by: INTERNAL MEDICINE

## 2020-07-14 PROCEDURE — 1036F TOBACCO NON-USER: CPT | Performed by: INTERNAL MEDICINE

## 2020-07-14 RX ORDER — AMLODIPINE BESYLATE 2.5 MG/1
TABLET ORAL
Qty: 90 TABLET | Refills: 0 | Status: SHIPPED | OUTPATIENT
Start: 2020-07-14 | End: 2020-10-09

## 2020-07-14 RX ORDER — BUTALBITAL, ACETAMINOPHEN AND CAFFEINE 50; 325; 40 MG/1; MG/1; MG/1
1 TABLET ORAL PRN
COMMUNITY

## 2020-07-14 NOTE — LETTER
[address] [facility]   Cardiovascular Evaluation    PATIENT: Kate Mills  DATE: 2020  MRN: <[id number]>  CSN: [id number]  : 1970      Primary Care Doctor: Gurpreet Boyce MD  Reason for evaluation:   Follow-up and Fatigue      Subjective:   History of present illness on initial date of evaluation:   Kate Mills is a 44 y.o. patient who presents with hx of Factor V leiden, HLD, HTN, Ehler's Danlos syndrome. She was admitted to [facility] in the beginning of February for chest pain and seen and followed by Dr. Silke Alcala. At that time pt was negative for ischemia on cardiac enzymes and ECG. + FMHx. A that time she had fevers and was s/p cervical spine injections so cath was delayed. As outpt continued to have OLIVAS and CP. Hx of abnormal stress test and now recent stress test was abnormal for probable inferolateral and apical scar with no reversibility seen. She was sent for [facility] out of concern for progression of symptoms. Cath showed nonobstructive CAD with evidence for possible spasm of LCx and CHRIS-II flow in RCA c/w endothelial dysfunction. Norvasc started. On 14she stated she was still having chest pain. The norvasc helped keep the episodes from lasting as long but still having some frequent chest pain occurrences. She had a bad episode of chest pain this morning. On 16 she presented to office for routine follow up and medication refills. She did note some occasional chest pain. She did report she does not take her medication as prescribed-daily instead of twice daily. She did not report any shortness of breath, edema, syncope, or fatigue. She did report chest pain with exertion and associated diaphoresis even with light activity like vacuuming. Today she reports doing ok. She states she has the \"occasional\" chest pain. She states it can happen out of the blue.   She states it feels like a \"cramp\", but does not last.  States the last time she took a SL nitro was about 6 months ago. She is taking her medications as prescribed without issue. Patient Active Problem List   Diagnosis    HTN (hypertension)    Diverticulitis    Abdominal pain, recurrent    Infarction of appendices epiploicae (HCC)    IBS (irritable bowel syndrome)    Fibromyalgia    Migraine    Ileus, postoperative    Myositis    Neal-Danlos syndrome    Factor V Leiden ([facility] [address]    Migraines    Overdose of benzodiazepine    Suicidal intent    Depression    Hypothyroidism    Chest pain    Abnormal nuclear stress test    SOB (shortness of breath)    Vitamin D deficiency    Hypertension    Hyperlipidemia    Cardiac syndrome X ([address]    Anxiety    History of coronary artery disease         Past Medical History:   has a past medical history of DDD (degenerative disc disease), Ehler's-Danlos syndrome, Factor V Leiden ([facility] [address] Fibromyalgia, Hyperlipidemia, Hypertension, IBS (irritable bowel syndrome), Intervertebral disc protrusion, Migraine, MRSA (methicillin resistant staph aureus) culture positive, MRSA nasal colonization, Nausea & vomiting, Psychiatric problem, Reflux, Thyroid disease, and Wears glasses. Surgical History:   has a past surgical history that includes Hysterectomy; Tonsillectomy; Foot surgery; laparoscopy (3/16/2012); fracture surgery; Endoscopy, colon, diagnostic; Colonoscopy; eye surgery; Upper gastrointestinal endoscopy (9/1012); and Cardiac catheterization (2/28/2014). Social History:   reports that she has never smoked. She has never used smokeless tobacco. She reports current alcohol use. She reports that she does not use drugs. Family History:  No evidence for sudden cardiac death or premature CAD    Home Medications:  Reviewed and are listed in nursing record.  and/or listed below  Current Outpatient Medications   Medication Sig Dispense Refill    amLODIPine (NORVASC) 2.5 MG tablet TAKE 1 TABLET BY MOUTH EVERY DAY 90 tablet 0 BP: 114/70   Pulse: 69   SpO2: 98%    Weight: 168 lb 3.2 oz (76.3 kg)     Wt Readings from Last 3 Encounters:   07/14/20 168 lb 3.2 oz (76.3 kg)   03/25/19 159 lb 14.4 oz (72.5 kg)   10/15/18 160 lb (72.6 kg)       General Appearance:  Alert, cooperative, no distress, appears stated age   Head:  Normocephalic, atraumatic   Eyes:  PERRL, conjunctiva/corneas clear   Nose: Nares normal, no drainage or sinus tenderness   Throat: Lips, mucosa, and tongue normal   Neck: Supple, symmetrical, trachea midline, NL thyroid no carotid bruit or JVD   Lungs:   CTAB, respirations unlabored   Chest Wall:  No tenderness or deformity   Heart:  Regular rhythm and normal rate; S1, S2 are normal;   no murmur noted; no rub or gallop   Abdomen:   Soft, non-tender, +BS x 4, no masses, no organomegaly   Extremities: Extremities normal, atraumatic, no cyanosis or edema   Pulses: 2+ and symmetric   Skin: Skin color, texture, turgor normal, no rashes or lesions   Pysch: Normal mood and affect   Neurologic: Normal gross motor and sensory exam.         LABS   CBC:      Lab Results   Component Value Date    WBC 8.0 03/20/2020    RBC 4.30 03/20/2020    HGB 13.3 03/20/2020    HCT 39.2 03/20/2020    MCV 91.2 03/20/2020    RDW 13.0 03/20/2020     03/20/2020     CMP:  Lab Results   Component Value Date     03/20/2020    K 3.9 03/20/2020    CL 99 03/20/2020    CO2 27 03/20/2020    BUN 18 03/20/2020    CREATININE 0.9 03/20/2020    GFRAA >60 03/20/2020    GFRAA >60 04/15/2013    AGRATIO 1.4 03/20/2020    LABGLOM >60 03/20/2020    GLUCOSE 117 03/20/2020    PROT 7.0 03/20/2020    PROT 7.8 03/04/2013    CALCIUM 9.2 03/20/2020    BILITOT <0.2 03/20/2020    ALKPHOS 66 03/20/2020    AST 13 03/20/2020    ALT 13 03/20/2020     PT/INR:   No components found for: PTPATIENT,  PTINR  Liver:  No components found for: CHLPL  Lab Results   Component Value Date    ALT 13 03/20/2020    AST 13 (L) 03/20/2020    ALKPHOS 66 03/20/2020 BILITOT <0.2 2020     No results found for: LABA1C  Lipids:         Lab Results   Component Value Date    TRIG 184 (H) 2019    TRIG 222 (H) 2018    TRIG 220 (H) 10/08/2015            Lab Results   Component Value Date    HDL 49 2019    HDL 51 2018    HDL 55 10/08/2015            Lab Results   Component Value Date    LDLCALC 136 (H) 2019    LDLCALC 151 (H) 2018    LDLCALC 131 (H) 10/08/2015            Lab Results   Component Value Date    LABVLDL 37 2019    LABVLDL 44 2018    LABVLDL 44 10/08/2015         CARDIAC DATA   EK2014 HR 76  Sinus Rhythm    ECHO/MUGA:    STRESS TEST: 3/25/19  Summary Normal LV function. There is normal isotope uptake at stress and rest. There is no evidence of  myocardial ischemia or scar. CARDIAC CATH: 2014  ANGIOGRAPHIC FINDINGS:  1. Left main coronary artery was short, with no significant focal stenosis. It did appear to trifurcate into an LAD, ramus branch, and left circumflex. 2.  The LAD artery was a large vessel that ran in the intraventricular groove  before wrapping the apex distally. Had evidence of luminal irregularities  throughout its course, without significant focal stenosis. 3.  The ramus intermedius branch was also a very large vessel that ran on the  anterior aspect of the heart. It did branch in its distal aspects. There  was evidence of a tubular lesion about 20% in the proximal portion that was  not flow limiting. 4.  The left circumflex was a very large vessel, giving off a decent sized OM  branch, as well as several small PLOMs. There was a 20% to 30% lesion that  was noted in the proximal portion of the left circumflex. However, the  diagnostic catheter was deeply seated. After the administration of  sublingual nitroglycerin, this did improve, consistent with coronary  vasospasm. 5.  The right coronary artery was noted to be dominant.   Had luminal Doneen Aschoff MD, personally performed the services described in this documentation as scribed by the above signed scribe in my presence, and it is both accurate and complete to the best of our ability and knowledge. I agree with the details independently gathered by my clinical support staff, while the remaining scribed note accurately describes my personal service to the patient. The above RN is working as a scribe for and in the presence of myself . Working as a scribe, the RN may have prepopulated components of this note with my historical intellectual property under my direct supervision. Any additions to this intellectual property were performed at my direction. Furthermore, the content and accuracy of this note have been reviewed by me to the best of my ability.        Ricky Morris MD, [facility] [place]   [phone] [facility]  [phone] [address] [place] [address]  7/14/2020  3:00 PM

## 2020-07-14 NOTE — PROGRESS NOTES
[address] [facility]   Cardiovascular Evaluation    PATIENT: Carmine Hector  DATE: 2020  MRN: <[id number]>  CSN: [id number]  : 1970      Primary Care Doctor: Princess Jeter MD  Reason for evaluation:   Follow-up and Fatigue      Subjective:   History of present illness on initial date of evaluation:   Carmine Hector is a 44 y.o. patient who presents with hx of Factor V leiden, HLD, HTN, Ehler's Danlos syndrome. She was admitted to [id number] [place] [address] in the beginning of February for chest pain and seen and followed by Dr. Marcella Amaya. At that time pt was negative for ischemia on cardiac enzymes and ECG. + FMHx. A that time she had fevers and was s/p cervical spine injections so cath was delayed. As outpt continued to have OLIVAS and CP. Hx of abnormal stress test and now recent stress test was abnormal for probable inferolateral and apical scar with no reversibility seen. She was sent for [facility] out of concern for progression of symptoms. Cath showed nonobstructive CAD with evidence for possible spasm of LCx and CHRIS-II flow in RCA c/w endothelial dysfunction. Norvasc started. On 14she stated she was still having chest pain. The norvasc helped keep the episodes from lasting as long but still having some frequent chest pain occurrences. She had a bad episode of chest pain this morning. On 16 she presented to office for routine follow up and medication refills. She did note some occasional chest pain. She did report she does not take her medication as prescribed-daily instead of twice daily. She did not report any shortness of breath, edema, syncope, or fatigue. She did report chest pain with exertion and associated diaphoresis even with light activity like vacuuming. Today she reports doing ok. She states she has the \"occasional\" chest pain. She states it can happen out of the blue.   She states it feels like a \"cramp\", but does not last.  States the last time she took a SL nitro was about 6 months ago. She is taking her medications as prescribed without issue. Patient Active Problem List   Diagnosis    HTN (hypertension)    Diverticulitis    Abdominal pain, recurrent    Infarction of appendices epiploicae (HCC)    IBS (irritable bowel syndrome)    Fibromyalgia    Migraine    Ileus, postoperative    Myositis    Neal-Danlos syndrome    Factor V Leiden ([facility] [address]    Migraines    Overdose of benzodiazepine    Suicidal intent    Depression    Hypothyroidism    Chest pain    Abnormal nuclear stress test    SOB (shortness of breath)    Vitamin D deficiency    Hypertension    Hyperlipidemia    Cardiac syndrome X ([address]    Anxiety    History of coronary artery disease         Past Medical History:   has a past medical history of DDD (degenerative disc disease), Ehler's-Danlos syndrome, Factor V Leiden ([facility] [address] Fibromyalgia, Hyperlipidemia, Hypertension, IBS (irritable bowel syndrome), Intervertebral disc protrusion, Migraine, MRSA (methicillin resistant staph aureus) culture positive, MRSA nasal colonization, Nausea & vomiting, Psychiatric problem, Reflux, Thyroid disease, and Wears glasses. Surgical History:   has a past surgical history that includes Hysterectomy; Tonsillectomy; Foot surgery; laparoscopy (3/16/2012); fracture surgery; Endoscopy, colon, diagnostic; Colonoscopy; eye surgery; Upper gastrointestinal endoscopy (9/1012); and Cardiac catheterization (2/28/2014). Social History:   reports that she has never smoked. She has never used smokeless tobacco. She reports current alcohol use. She reports that she does not use drugs. Family History:  No evidence for sudden cardiac death or premature CAD    Home Medications:  Reviewed and are listed in nursing record.  and/or listed below  Current Outpatient Medications   Medication Sig Dispense Refill    amLODIPine (NORVASC) 2.5 MG tablet TAKE 1 TABLET BY MOUTH EVERY DAY 90 tablet 0    butalbital-acetaminophen-caffeine (FIORICET, ESGIC) -40 MG per tablet Take 1 tablet by mouth as needed for Headaches      pravastatin (PRAVACHOL) 10 MG tablet TAKE 1/2 TABLET BY MOUTH EVERY DAY 45 tablet 0    nitroGLYCERIN (NITROSTAT) 0.4 MG SL tablet PLACE ONE TABLET UNDER THE TONGUE EVERY 5 MINUTES AS NEEDED FOR CHEST PAIN. 25 tablet 2    ranolazine (RANEXA) 1000 MG extended release tablet Take 1 tablet by mouth 2 times daily 180 tablet 3    morphine (MS CONTIN) 15 MG extended release tablet Take 15 mg by mouth 2 times daily. Chelo Pee  tiZANidine (ZANAFLEX) 4 MG tablet Take 1 tablet by mouth nightly as needed  2    Cholecalciferol (VITAMIN D PO) Take by mouth      ondansetron (ZOFRAN ODT) 4 MG disintegrating tablet Take 1 tablet by mouth every 8 hours as needed for Nausea or Vomiting 20 tablet 0    [employer] ASPIRIN ADULT LOW DOSE 81 MG chewable tablet TAKE 1 TABLET BY MOUTH EVERY DAY 30 tablet 2    FLUoxetine (PROZAC) 20 MG capsule TAKE 1 CAPSULE BY MOUTH DAILY. 30 capsule 5    LYRICA 50 MG capsule TAKE ONE CAPSULE BY MOUTH DAILY  2    levothyroxine (SYNTHROID) 25 MCG tablet TAKE 1 TABLET BY MOUTH DAILY. 30 tablet 6    omeprazole (PRILOSEC) 40 MG capsule TAKE ONE CAPSULE EVERY DAY 30 capsule 0    ibuprofen (ADVIL;MOTRIN) 600 MG tablet Take 1 tablet by mouth 3 times daily. 120 tablet 0    Polyethylene Glycol 3350 (MIRALAX PO) Take  by mouth.  clorazepate (TRANXENE) 7.5 MG tablet TAKE 1 TABLET BY MOUTH TWICE A DAY AS NEEDED FOR ANXIETY (Patient not taking: Reported on 7/14/2020) 60 tablet 0     No current facility-administered medications for this visit. Allergies:  Prochlorperazine edisylate; Neurontin [gabapentin]; Other; Hydrocodone-acetaminophen; and Hydromorphone     Review of Systems:   All 12 point review of symptoms completed. Pertinent positives identified in the HPI, all other review of symptoms negative as below.     Objective:   PHYSICAL EXAM:    Vitals:    07/14/20 1436   BP: 114/70   Pulse: 69   SpO2: 98%    Weight: 168 lb 3.2 oz (76.3 kg)     Wt Readings from Last 3 Encounters:   07/14/20 168 lb 3.2 oz (76.3 kg)   03/25/19 159 lb 14.4 oz (72.5 kg)   10/15/18 160 lb (72.6 kg)       General Appearance:  Alert, cooperative, no distress, appears stated age   Head:  Normocephalic, atraumatic   Eyes:  PERRL, conjunctiva/corneas clear   Nose: Nares normal, no drainage or sinus tenderness   Throat: Lips, mucosa, and tongue normal   Neck: Supple, symmetrical, trachea midline, NL thyroid no carotid bruit or JVD   Lungs:   CTAB, respirations unlabored   Chest Wall:  No tenderness or deformity   Heart:  Regular rhythm and normal rate; S1, S2 are normal;   no murmur noted; no rub or gallop   Abdomen:   Soft, non-tender, +BS x 4, no masses, no organomegaly   Extremities: Extremities normal, atraumatic, no cyanosis or edema   Pulses: 2+ and symmetric   Skin: Skin color, texture, turgor normal, no rashes or lesions   Pysch: Normal mood and affect   Neurologic: Normal gross motor and sensory exam.         LABS   CBC:      Lab Results   Component Value Date    WBC 8.0 03/20/2020    RBC 4.30 03/20/2020    HGB 13.3 03/20/2020    HCT 39.2 03/20/2020    MCV 91.2 03/20/2020    RDW 13.0 03/20/2020     03/20/2020     CMP:  Lab Results   Component Value Date     03/20/2020    K 3.9 03/20/2020    CL 99 03/20/2020    CO2 27 03/20/2020    BUN 18 03/20/2020    CREATININE 0.9 03/20/2020    GFRAA >60 03/20/2020    GFRAA >60 04/15/2013    AGRATIO 1.4 03/20/2020    LABGLOM >60 03/20/2020    GLUCOSE 117 03/20/2020    PROT 7.0 03/20/2020    PROT 7.8 03/04/2013    CALCIUM 9.2 03/20/2020    BILITOT <0.2 03/20/2020    ALKPHOS 66 03/20/2020    AST 13 03/20/2020    ALT 13 03/20/2020     PT/INR:   No components found for: PTPATIENT,  PTINR  Liver:  No components found for: CHLPL  Lab Results   Component Value Date    ALT 13 03/20/2020    AST 13 (L) 03/20/2020    ALKPHOS 66 03/20/2020    BILITOT <0.2 03/20/2020 No results found for: LABA1C  Lipids:         Lab Results   Component Value Date    TRIG 184 (H) 2019    TRIG 222 (H) 2018    TRIG 220 (H) 10/08/2015            Lab Results   Component Value Date    HDL 49 2019    HDL 51 2018    HDL 55 10/08/2015            Lab Results   Component Value Date    LDLCALC 136 (H) 2019    LDLCALC 151 (H) 2018    LDLCALC 131 (H) 10/08/2015            Lab Results   Component Value Date    LABVLDL 37 2019    LABVLDL 44 2018    LABVLDL 44 10/08/2015         CARDIAC DATA   EK2014 HR 76  Sinus Rhythm    ECHO/MUGA:    STRESS TEST: 3/25/19  Summary Normal LV function. There is normal isotope uptake at stress and rest. There is no evidence of  myocardial ischemia or scar. CARDIAC CATH: 2014  ANGIOGRAPHIC FINDINGS:  1. Left main coronary artery was short, with no significant focal stenosis. It did appear to trifurcate into an LAD, ramus branch, and left circumflex. 2.  The LAD artery was a large vessel that ran in the intraventricular groove  before wrapping the apex distally. Had evidence of luminal irregularities  throughout its course, without significant focal stenosis. 3.  The ramus intermedius branch was also a very large vessel that ran on the  anterior aspect of the heart. It did branch in its distal aspects. There  was evidence of a tubular lesion about 20% in the proximal portion that was  not flow limiting. 4.  The left circumflex was a very large vessel, giving off a decent sized OM  branch, as well as several small PLOMs. There was a 20% to 30% lesion that  was noted in the proximal portion of the left circumflex. However, the  diagnostic catheter was deeply seated. After the administration of  sublingual nitroglycerin, this did improve, consistent with coronary  vasospasm. 5.  The right coronary artery was noted to be dominant. Had luminal  irregularities through out its course. There was slow CHRIS-2 flow throughout  the LAD, consistent with microvascular disease/endophyll dysfunction. ASSESSMENT AND PLAN:  At this time, the patient presents with nonobstructive  coronary artery disease as dictated above. She does have evidence of  coronary vasospasm in the left circumflex that is possibly catheter induced, that did improve with nitroglycerin. She has evidence of CHRIS-2 sluggish flow in the RCA, suggesting endothelial dysfunction. However, No significant stenosis requiring intervention at this time. We will go ahead and start her on Norvasc for her problems as above, and she will follow up with Dr. Patricia Johnson for her symptomatology. VASCULAR/OTHER IMAGING:  CTA pulm: 10/09/14  FINDINGS: IV contrast was given using CTPA technique. No pulmonary emboli. No pleural effusions. No acute. No lung mass. There is a tiny liver cyst.        Assessment and Plan   Jh Santos is a 44 y.o. female who presents today for the following problems:      1. CP/Prinzmetal's angina: Controlled, rare events   - secondary to coronary vasospasm vs endothelial dysfunction. 2. Nonobstructive CAD  3. HLD: intolerant to statins but confounded by fibromyalgia   -  Myalgias  With lipitor, doing ok on low dose pravastatin  4. HTN: controlled    MD Plan:  1. Labs - Lipids   - may try to increase to 20mg as tolerated based on lipids  2. Follow up with me in 1 year        Patient Plan:   1. Labs - fasting Lipids   ~we will call you with the results  2. No change in medications  3. Follow up in 1 year with NP or sooner if needed           It is a pleasure to assist in the care of Jh Santos. Please call with any questions. All questions and concerns were addressed to the patient/family. Alternatives to my treatment were discussed.      This note was scribed in the presence of Harman Dailey MD by Yocasta Tomlin RN.      Gracie Carrillo MD, personally performed the services described in this documentation as

## 2020-07-14 NOTE — PATIENT INSTRUCTIONS
Patient Plan:   1. Labs - fasting Lipids   ~we will call you with the results  2. No change in medications  3.  Follow up in 1 year with NP or sooner if needed

## 2020-08-20 ENCOUNTER — TELEPHONE (OUTPATIENT)
Dept: CARDIOLOGY CLINIC | Age: 50
End: 2020-08-20

## 2020-08-20 NOTE — TELEPHONE ENCOUNTER
Spoke with patient. She reports she has taken too much ranexa for three days. She reports she has taken 2000mg nightly and woke this AM with dizziness, confusion, weakness. I gave her the number to poison control and informed her she needed to go the emergency department. Patient and daughter verbalized understanding.

## 2020-08-25 RX ORDER — PRAVASTATIN SODIUM 10 MG
TABLET ORAL
Qty: 45 TABLET | Refills: 3 | Status: SHIPPED | OUTPATIENT
Start: 2020-08-25 | End: 2021-08-19 | Stop reason: SDUPTHER

## 2020-10-09 RX ORDER — AMLODIPINE BESYLATE 2.5 MG/1
TABLET ORAL
Qty: 90 TABLET | Refills: 3 | Status: SHIPPED | OUTPATIENT
Start: 2020-10-09 | End: 2021-11-04

## 2020-11-06 ENCOUNTER — TELEPHONE (OUTPATIENT)
Dept: CARDIOLOGY CLINIC | Age: 50
End: 2020-11-06

## 2020-11-06 NOTE — TELEPHONE ENCOUNTER
Pt stopped in with Cardiac clearance paperwork. Is having surgery on 11/23 and needs paperwork signed. Per pt they wanted her to stop her baby aspirin 7 days prior. Put paperwork in [employer] folder.

## 2021-08-18 NOTE — PROGRESS NOTES
[address] [facility]   Cardiovascular Evaluation    PATIENT: Vel Anguiano  DATE: 2021  MRN: <[id number]>  CSN: [id number]  : 1970      Primary Care Doctor: Luis A Perez MD  Reason for evaluation:   1 Year Follow Up      Subjective:   History of present illness on initial date of evaluation:   Vel Anguiano is a 46 y.o. patient who presents with hx of Factor V leiden, HLD, HTN, Ehler's Danlos syndrome. She was admitted to [address] [place] [address] in the beginning of February for chest pain and seen and followed by Dr. Marcelle Hdez. At that time pt was negative for ischemia on cardiac enzymes and ECG. + FMHx. A that time she had fevers and was s/p cervical spine injections so cath was delayed. As outpt continued to have OLIVAS and CP. Hx of abnormal stress test and now recent stress test was abnormal for probable inferolateral and apical scar with no reversibility seen. She was sent for [facility] out of concern for progression of symptoms. Cath showed nonobstructive CAD with evidence for possible spasm of LCx and CHRIS-II flow in RCA c/w endothelial dysfunction. Norvasc started. On 14 she stated she was still having chest pain. The norvasc helped keep the episodes from lasting as long but still having some frequent chest pain occurrences. She had a bad episode of chest pain this morning. On 16 she presented to office for routine follow up and medication refills. She did note some occasional chest pain. She did report she does not take her medication as prescribed-daily instead of twice daily. She did not report any shortness of breath, edema, syncope, or fatigue. She did report chest pain with exertion and associated diaphoresis even with light activity like vacuuming. Today, 2021, she presents to the office for follow up for chest pain, hypertension, and hyperlipidemia. She states she has been feeling well overall. She is currently tolerating her pravastatin 1/2 a tablet daily.  She states she had random chest pain last week which resolved with one Nitro. She states she accidentally took 2,000 mg on Ranexa one morning and felt disoriented for awhile. She states she has been having some SOB and sweating related to exertion like walking through the store. She has also noted some leg swelling worse as the day goes on and better as she elevated her legs in the evenings. Patient currently denies any weight gain palpitations, dizziness, and syncope. COVID + this past December 2020. Patient Active Problem List   Diagnosis    Diverticulitis    Abdominal pain, recurrent    Infarction of appendices epiploicae (HCC)    IBS (irritable bowel syndrome)    Fibromyalgia    Migraine    Ileus, postoperative    Myositis    Neal-Danlos syndrome    Factor V Leiden ([facility]    Migraines    Overdose of benzodiazepine    Suicidal intent    Depression    Hypothyroidism    Chest pain    Abnormal nuclear stress test    SOB (shortness of breath)    Vitamin D deficiency    Hypertension    Hyperlipidemia    Cardiac syndrome X ([address]    Anxiety    History of coronary artery disease         Past Medical History:   has a past medical history of DDD (degenerative disc disease), Ehler's-Danlos syndrome, Factor V Leiden ([address] Fibromyalgia, Hyperlipidemia, Hypertension, IBS (irritable bowel syndrome), Intervertebral disc protrusion, Migraine, MRSA (methicillin resistant staph aureus) culture positive, MRSA nasal colonization, Nausea & vomiting, Psychiatric problem, Reflux, Thyroid disease, and Wears glasses. Surgical History:   has a past surgical history that includes Hysterectomy; Tonsillectomy; Foot surgery; laparoscopy (3/16/2012); fracture surgery; Endoscopy, colon, diagnostic; Colonoscopy; eye surgery; Upper gastrointestinal endoscopy (9/1012); and Cardiac catheterization (2/28/2014). Social History:   reports that she has never smoked.  She has never used smokeless tobacco. She reports current alcohol use. She reports that she does not use drugs. Family History:  No evidence for sudden cardiac death or premature CAD    Home Medications:  Reviewed and are listed in nursing record. and/or listed below  Current Outpatient Medications   Medication Sig Dispense Refill    amLODIPine (NORVASC) 2.5 MG tablet TAKE 1 TABLET BY MOUTH EVERY DAY 90 tablet 3    pravastatin (PRAVACHOL) 10 MG tablet TAKE 1/2 TABLET BY MOUTH EVERY DAY 45 tablet 3    butalbital-acetaminophen-caffeine (FIORICET, ESGIC) -40 MG per tablet Take 1 tablet by mouth as needed for Headaches      nitroGLYCERIN (NITROSTAT) 0.4 MG SL tablet PLACE ONE TABLET UNDER THE TONGUE EVERY 5 MINUTES AS NEEDED FOR CHEST PAIN. 25 tablet 2    ranolazine (RANEXA) 1000 MG extended release tablet Take 1 tablet by mouth 2 times daily 180 tablet 3    morphine (MS CONTIN) 15 MG extended release tablet Take 15 mg by mouth 2 times daily. Jesus Bowers  tiZANidine (ZANAFLEX) 4 MG tablet Take 1 tablet by mouth nightly as needed  2    Cholecalciferol (VITAMIN D PO) Take by mouth      [facility] ASPIRIN ADULT LOW DOSE 81 MG chewable tablet TAKE 1 TABLET BY MOUTH EVERY DAY 30 tablet 2    FLUoxetine (PROZAC) 20 MG capsule TAKE 1 CAPSULE BY MOUTH DAILY. 30 capsule 5    LYRICA 50 MG capsule TAKE ONE CAPSULE BY MOUTH DAILY  2    levothyroxine (SYNTHROID) 25 MCG tablet TAKE 1 TABLET BY MOUTH DAILY. 30 tablet 6    omeprazole (PRILOSEC) 40 MG capsule TAKE ONE CAPSULE EVERY DAY 30 capsule 0    ibuprofen (ADVIL;MOTRIN) 600 MG tablet Take 1 tablet by mouth 3 times daily. 120 tablet 0    Polyethylene Glycol 3350 (MIRALAX PO) Take  by mouth.       ondansetron (ZOFRAN ODT) 4 MG disintegrating tablet Take 1 tablet by mouth every 8 hours as needed for Nausea or Vomiting (Patient not taking: Reported on 8/19/2021) 20 tablet 0    clorazepate (TRANXENE) 7.5 MG tablet TAKE 1 TABLET BY MOUTH TWICE A DAY AS NEEDED FOR ANXIETY (Patient not taking: Reported on 7/14/2020) 60 tablet 0     No current facility-administered medications for this visit. Allergies:  Prochlorperazine edisylate, Neurontin [gabapentin], Other, Hydrocodone-acetaminophen, and Hydromorphone     Review of Systems:   All 12 point review of symptoms completed. Pertinent positives identified in the HPI, all other review of symptoms negative as below.     Objective:   PHYSICAL EXAM:    Vitals:    08/19/21 0956   BP: 120/78   Pulse: 70   SpO2: 99%    Weight: 166 lb (75.3 kg)     Wt Readings from Last 3 Encounters:   08/19/21 166 lb (75.3 kg)   07/14/20 168 lb 3.2 oz (76.3 kg)   03/25/19 159 lb 14.4 oz (72.5 kg)       General Appearance:  Alert, cooperative, no distress, appears stated age   Head:  Normocephalic, atraumatic   Eyes:  PERRL, conjunctiva/corneas clear   Nose: Nares normal, no drainage or sinus tenderness   Throat: Lips, mucosa, and tongue normal   Neck: Supple, symmetrical, trachea midline, NL thyroid no carotid bruit or JVD   Lungs:   CTAB, respirations unlabored   Chest Wall:  No tenderness or deformity   Heart:  Regular rhythm and normal rate; S1, S2 are normal;   no murmur noted; no rub or gallop   Abdomen:   Soft, non-tender, +BS x 4, no masses, no organomegaly   Extremities: Extremities normal, atraumatic, no cyanosis or edema   Pulses: 2+ and symmetric   Skin: Skin color, texture, turgor normal, no rashes or lesions   Pysch: Normal mood and affect   Neurologic: Normal gross motor and sensory exam.         LABS   CBC:      Lab Results   Component Value Date    WBC 8.0 03/20/2020    RBC 4.30 03/20/2020    HGB 13.3 03/20/2020    HCT 39.2 03/20/2020    MCV 91.2 03/20/2020    RDW 13.0 03/20/2020     03/20/2020     CMP:  Lab Results   Component Value Date     03/20/2020    K 3.9 03/20/2020    CL 99 03/20/2020    CO2 27 03/20/2020    BUN 18 03/20/2020    CREATININE 0.9 03/20/2020    GFRAA >60 03/20/2020    GFRAA >60 04/15/2013    AGRATIO 1.4 03/20/2020    LABGLOM >60 03/20/2020    GLUCOSE 117 03/20/2020 PROT 7.0 2020    PROT 7.8 2013    CALCIUM 9.2 2020    BILITOT <0.2 2020    ALKPHOS 66 2020    AST 13 2020    ALT 13 2020     PT/INR:   No components found for: PTPATIENT,  PTINR  Liver:  No components found for: CHLPL  Lab Results   Component Value Date    ALT 13 2020    AST 13 (L) 2020    ALKPHOS 66 2020    BILITOT <0.2 2020     No results found for: LABA1C  Lipids:         Lab Results   Component Value Date    TRIG 184 (H) 2019    TRIG 222 (H) 2018    TRIG 220 (H) 10/08/2015            Lab Results   Component Value Date    HDL 49 2019    HDL 51 2018    HDL 55 10/08/2015            Lab Results   Component Value Date    LDLCALC 136 (H) 2019    LDLCALC 151 (H) 2018    LDLCALC 131 (H) 10/08/2015            Lab Results   Component Value Date    LABVLDL 37 2019    LABVLDL 44 2018    LABVLDL 44 10/08/2015         CARDIAC DATA   EK2014 HR 76  Sinus Rhythm    ECHO/MUGA: 2018  Summary   Left ventricular systolic function is normal with ejection fraction   estimated at 55%. No regional wall motion abnormalities. Normal left ventricular diastolic filling pressure. Systolic pulmonary artery pressure (SPAP) is normal estimated at 13 mmHg   (Right atrial pressure of 3 mmHg). STRESS TEST: 3/25/19  Summary Normal LV function. There is normal isotope uptake at stress and rest. There is no evidence of  myocardial ischemia or scar. CARDIAC CATH: 2014  ANGIOGRAPHIC FINDINGS:  1. Left main coronary artery was short, with no significant focal stenosis. It did appear to trifurcate into an LAD, ramus branch, and left circumflex. 2.  The LAD artery was a large vessel that ran in the intraventricular groove  before wrapping the apex distally. Had evidence of luminal irregularities  throughout its course, without significant focal stenosis.      3.  The ramus intermedius branch was also a very large vessel that ran on the  anterior aspect of the heart. It did branch in its distal aspects. There  was evidence of a tubular lesion about 20% in the proximal portion that was  not flow limiting. 4.  The left circumflex was a very large vessel, giving off a decent sized OM  branch, as well as several small PLOMs. There was a 20% to 30% lesion that  was noted in the proximal portion of the left circumflex. However, the  diagnostic catheter was deeply seated. After the administration of  sublingual nitroglycerin, this did improve, consistent with coronary  vasospasm. 5.  The right coronary artery was noted to be dominant. Had luminal  irregularities through out its course. There was slow CHRIS-2 flow throughout  the LAD, consistent with microvascular disease/endophyll dysfunction. ASSESSMENT AND PLAN:  At this time, the patient presents with nonobstructive  coronary artery disease as dictated above. She does have evidence of  coronary vasospasm in the left circumflex that is possibly catheter induced, that did improve with nitroglycerin. She has evidence of CHRIS-2 sluggish flow in the RCA, suggesting endothelial dysfunction. However, No significant stenosis requiring intervention at this time. We will go ahead and start her on Norvasc for her problems as above, and she will follow up with Dr. Ting King for her symptomatology. VASCULAR/OTHER IMAGING:  CTA pulm: 10/09/14  FINDINGS: IV contrast was given using CTPA technique. No pulmonary emboli. No pleural effusions. No acute. No lung mass. There is a tiny liver cyst.        Assessment and Plan   Zay Hauser is a 46 y.o. female who presents today for the following problems:      1. CP/Prinzmetal's angina: Controlled, rare events   - secondary to coronary vasospasm vs endothelial dysfunction. 2. Nonobstructive CAD  3.  HLD: intolerant to statins but confounded by fibromyalgia   -  Myalgias  With lipitor, doing ok on low dose pravastatin  4. HTN: controlled  5. History of Covid: December 2020      MD Plan:  1. Patient is doing well she is only had 1 event of angina that required nitroglycerin resolved after 1 nitro   -Stated this is typical and call if the symptoms progress in frequency or intensity  2. Patient is tolerating ultra low-dose pravastatin given fibromyalgia    -Increase pravastatin from 5 to 10 mg nightly   - lipids 3 month if tolerates        Patient Plan:   1. Call if you have an increase in chest pain episodes   2. Recommend increasing pravastatin to a full tablet daily   3. Recommend an echocardiogram   4. Increase walking and elevate feet above heart when possible. You can try compression stockings through the day and off at night. ([email or web address])  5. Discuss sweating with Kelli Summers MD as well   6. Follow up with me in 1 year     It is a pleasure to assist in the care of Sebas Em. Please call with any questions. All questions and concerns were addressed to the patient/family. Alternatives to my treatment were discussed. This note was scribed in the presence of Dr. Michael Zelaya M.D. By Michael Jay MD, personally performed the services described in this documentation as scribed by the above signed scribe in my presence, and it is both accurate and complete to the best of our ability and knowledge. I agree with the details independently gathered by my clinical support staff, while the remaining scribed note accurately describes my personal service to the patient. The above RN is working as a scribe for and in the presence of myself . Working as a scribe, the RN may have prepopulated components of this note with my historical intellectual property under my direct supervision. Any additions to this intellectual property were performed at my direction. Furthermore, the content and accuracy of this note have been reviewed by me to the best of my ability.        Bayron Tena Clari Palacios, [facility]   [phone] [facility]  [phone] [address]  8/19/2021  10:02 AM

## 2021-08-19 ENCOUNTER — OFFICE VISIT (OUTPATIENT)
Dept: CARDIOLOGY CLINIC | Age: 51
End: 2021-08-19
Payer: MEDICARE

## 2021-08-19 VITALS
HEART RATE: 70 BPM | WEIGHT: 166 LBS | BODY MASS INDEX: 32.59 KG/M2 | OXYGEN SATURATION: 99 % | SYSTOLIC BLOOD PRESSURE: 120 MMHG | HEIGHT: 60 IN | DIASTOLIC BLOOD PRESSURE: 78 MMHG

## 2021-08-19 DIAGNOSIS — E78.2 MIXED HYPERLIPIDEMIA: ICD-10-CM

## 2021-08-19 DIAGNOSIS — Z86.16 HISTORY OF COVID-19: ICD-10-CM

## 2021-08-19 DIAGNOSIS — I20.1 PRINZMETAL ANGINA (HCC): Primary | ICD-10-CM

## 2021-08-19 DIAGNOSIS — R60.0 LOCALIZED EDEMA: ICD-10-CM

## 2021-08-19 DIAGNOSIS — R06.02 SOB (SHORTNESS OF BREATH): ICD-10-CM

## 2021-08-19 DIAGNOSIS — Z86.79 HISTORY OF CORONARY ARTERY DISEASE: ICD-10-CM

## 2021-08-19 DIAGNOSIS — I10 ESSENTIAL HYPERTENSION: ICD-10-CM

## 2021-08-19 PROCEDURE — 3017F COLORECTAL CA SCREEN DOC REV: CPT | Performed by: INTERNAL MEDICINE

## 2021-08-19 PROCEDURE — G8427 DOCREV CUR MEDS BY ELIG CLIN: HCPCS | Performed by: INTERNAL MEDICINE

## 2021-08-19 PROCEDURE — 1036F TOBACCO NON-USER: CPT | Performed by: INTERNAL MEDICINE

## 2021-08-19 PROCEDURE — 99214 OFFICE O/P EST MOD 30 MIN: CPT | Performed by: INTERNAL MEDICINE

## 2021-08-19 PROCEDURE — G8417 CALC BMI ABV UP PARAM F/U: HCPCS | Performed by: INTERNAL MEDICINE

## 2021-08-19 RX ORDER — PRAVASTATIN SODIUM 10 MG
TABLET ORAL
Qty: 90 TABLET | Refills: 3 | Status: SHIPPED | OUTPATIENT
Start: 2021-08-19 | End: 2022-06-13

## 2021-08-19 NOTE — PATIENT INSTRUCTIONS
Patient Plan:   1. Call if you have an increase in chest pain episodes   2. Recommend increasing pravastatin to a full tablet daily   3. Recommend an echocardiogram   4. Increase walking and elevate feet above heart when possible. You can try compression stockings through the day and off at night. ([email or web address])  5. Discuss sweating with Tatianna Braswell MD as well   6. Follow up with NP in 1 year   Your provider has ordered testing for further evaluation. An order/prescription has been included in your paper work.  To schedule outpatient testing, contact Central Scheduling by calling [address] [place] [address] ([phone]).

## 2021-08-30 ENCOUNTER — HOSPITAL ENCOUNTER (OUTPATIENT)
Dept: NON INVASIVE DIAGNOSTICS | Age: 51
Discharge: HOME | End: 2021-08-30
Payer: MEDICARE

## 2021-08-30 DIAGNOSIS — I10 ESSENTIAL HYPERTENSION: ICD-10-CM

## 2021-08-30 DIAGNOSIS — R60.0 LOCALIZED EDEMA: ICD-10-CM

## 2021-08-30 DIAGNOSIS — R06.02 SOB (SHORTNESS OF BREATH): ICD-10-CM

## 2021-08-30 DIAGNOSIS — Z86.79 HISTORY OF CORONARY ARTERY DISEASE: ICD-10-CM

## 2021-08-30 LAB
LV EF: 58 %
LVEF MODALITY: NORMAL

## 2021-08-30 PROCEDURE — 93306 TTE W/DOPPLER COMPLETE: CPT

## 2021-09-02 ENCOUNTER — TELEPHONE (OUTPATIENT)
Dept: CARDIOLOGY CLINIC | Age: 51
End: 2021-09-02

## 2021-09-02 NOTE — TELEPHONE ENCOUNTER
----- Message from Al Gray MD sent at 9/1/2021  4:46 PM EDT -----  Please let patient know that her echo appears to be normal.  I do not see that I ordered this and I am not sure why it was done.

## 2021-11-04 RX ORDER — AMLODIPINE BESYLATE 2.5 MG/1
TABLET ORAL
Qty: 90 TABLET | Refills: 3 | Status: SHIPPED | OUTPATIENT
Start: 2021-11-04 | End: 2022-10-21

## 2021-11-09 ENCOUNTER — HOSPITAL ENCOUNTER (OUTPATIENT)
Dept: MRI IMAGING | Age: 51
Discharge: HOME | End: 2021-11-09
Payer: MEDICARE

## 2021-11-09 DIAGNOSIS — M79.604 RIGHT LEG PAIN: ICD-10-CM

## 2021-11-09 PROCEDURE — 73718 MRI LOWER EXTREMITY W/O DYE: CPT

## 2022-02-10 DIAGNOSIS — R07.9 CHEST PAIN, UNSPECIFIED TYPE: Primary | ICD-10-CM

## 2022-02-10 NOTE — TELEPHONE ENCOUNTER
Pt is requesting a refill on Ranexa 1000 mg. Please clarify how many times a day pt should be taking this. Preferred [facility] @ [phone]. Last OV 8/19/2021 with SALVADOR.

## 2022-02-11 RX ORDER — RANOLAZINE 1000 MG/1
1000 TABLET, EXTENDED RELEASE ORAL 2 TIMES DAILY
Qty: 180 TABLET | Refills: 3 | Status: SHIPPED | OUTPATIENT
Start: 2022-02-11

## 2022-02-11 NOTE — TELEPHONE ENCOUNTER
[facility] called to let Lara Morales know that the Fioricet decreases the efficacy of the Ranexa. [facility] wants to know if Lara Pabloine is okay with this or if pt should take something else ?  Please call [facility] @ [phone] and advise

## 2022-02-11 NOTE — TELEPHONE ENCOUNTER
I think, at present time, it would be reasonable to continue with the Ranexa. Can reevaluate this further when patient follows up in the office.   Thank you

## 2022-05-24 RX ORDER — NITROGLYCERIN 0.4 MG/1
TABLET SUBLINGUAL
Qty: 25 TABLET | Refills: 2 | Status: SHIPPED | OUTPATIENT
Start: 2022-05-24 | End: 2022-07-22

## 2022-06-13 RX ORDER — PRAVASTATIN SODIUM 10 MG
TABLET ORAL
Qty: 90 TABLET | Refills: 0 | Status: SHIPPED | OUTPATIENT
Start: 2022-06-13 | End: 2022-10-21

## 2022-06-17 ENCOUNTER — TELEPHONE (OUTPATIENT)
Dept: CARDIOLOGY CLINIC | Age: 52
End: 2022-06-17

## 2022-06-17 NOTE — TELEPHONE ENCOUNTER
Patient is okay to proceed at low cardiac risk.   Heart rate is already low so I would not start a beta-blocker at this time however given history of coronary spasm I would suggest patient continue Norvasc that he is already taking throughout the procedure including the day of

## 2022-06-17 NOTE — LETTER
[address] [facility] - [address]  Phone: [phone]  Fax: [phone]    Jorgito Taylor MD        June 17, 2022    Vel Silvio   1970  [address] [facility] [address]      To whom it may concern:     Vel Anguiano may proceed with the scheduled cervical 5-7 laminotomy under general anesthesia at a low cardiac risk. Given the patient's history of coronary spasm, I suggest the patient continue Norvasc throughout the procedure including the day of. If you have any questions or concerns, please don't hesitate to call.     Sincerely,        Jorgito Taylor MD

## 2022-06-17 NOTE — TELEPHONE ENCOUNTER
Dr. Iesha Barbour requesting cardiac clearance for cervical 5-7 laminotomy under general anesthesia on 6/28/2022

## 2022-07-22 RX ORDER — NITROGLYCERIN 0.4 MG/1
TABLET SUBLINGUAL
Qty: 25 TABLET | Refills: 2 | Status: SHIPPED | OUTPATIENT
Start: 2022-07-22 | End: 2022-10-05

## 2022-09-20 ENCOUNTER — TELEPHONE (OUTPATIENT)
Dept: CARDIOLOGY CLINIC | Age: 52
End: 2022-09-20

## 2022-09-20 NOTE — TELEPHONE ENCOUNTER
Spoke to pt. Pt has schedule annual OV with [facility]OR on 10/5/22 (before procedure). Informed pt cardiac clearance would be determined at [address]

## 2022-09-20 NOTE — TELEPHONE ENCOUNTER
CARDIAC CLEARANCE REQUEST    What type of procedure are you having: left elbow    Are you taking any blood thinners: ASA 81 mg    When is your procedure scheduled for: 10/13/22    What physician is performing your procedure:  Dr Deidra Gill with     Phone Number:[phone]    Fax number to send the letter:

## 2022-09-20 NOTE — TELEPHONE ENCOUNTER
SALVADOR JO     Attempted to call pt to clarify what surgery she is having. LVM for pt to return call. According to care everywhere pt is having LEFT ARTHROSCOPY ELBOW WITH OPEN NIRSCHL.  Please advise cardiac risk and if / how pt should hold Aspirin 81mg

## 2022-09-20 NOTE — TELEPHONE ENCOUNTER
Rec: assessing if any change in cardiac status since last encounter. Additionally, obtain f/u ekg to further assess cardiac status. Thank you.

## 2022-10-03 NOTE — PROGRESS NOTES
[facility]   Cardiovascular Evaluation    PATIENT: Sandrine Alejo  DATE: 10/5/2022  MRN: [id number]  CSN: [id number]  : 1970      Primary Care Doctor: Carli Cortes MD  Reason for evaluation:   Follow-up, Cardiac Clearance ([place] ), Hyperlipidemia, and Hypertension      Subjective:   History of present illness on initial date of evaluation:   Sandrine Alejo is a 45 y.o. patient who presents with hx of Factor V leiden, HLD, HTN, Ehler's Danlos syndrome. She was admitted to [facility] in the beginning of February for chest pain and seen and followed by Dr. Horacio Hughes. At that time pt was negative for ischemia on cardiac enzymes and ECG. + FMHx. A that time she had fevers and was s/p cervical spine injections so cath was delayed. As outpt continued to have OLIVAS and CP. Hx of abnormal stress test and now recent stress test was abnormal for probable inferolateral and apical scar with no reversibility seen. She was sent for [facility] out of concern for progression of symptoms. Cath showed nonobstructive CAD with evidence for possible spasm of LCx and CHRIS-II flow in RCA c/w endothelial dysfunction. Norvasc started. On 14 she stated she was still having chest pain. The norvasc helped keep the episodes from lasting as long but still having some frequent chest pain occurrences. She had a bad episode of chest pain this morning. On 16 she presented to office for routine follow up and medication refills. She did note some occasional chest pain. She did report she does not take her medication as prescribed-daily instead of twice daily. She did not report any shortness of breath, edema, syncope, or fatigue. She did report chest pain with exertion and associated diaphoresis even with light activity like vacuuming. OV, 2021, she presents to the office for follow up for chest pain, hypertension, and hyperlipidemia. She states she has been feeling well overall.  She is currently tolerating her pravastatin 1/2 a tablet daily. She states she had random chest pain last week which resolved with one Nitro. She states she accidentally took 2,000 mg on Ranexa one morning and felt disoriented for awhile. She states she has been having some SOB and sweating related to exertion like walking through the store. She has also noted some leg swelling worse as the day goes on and better as she elevated her legs in the evenings. COVID + this past December 2020. Today she is here for cardiac clearance. She is having elbow surgery for tennis elbow. She occasionally gets a twinge in her chest. This goes away on its own. Patient is taking all cardiac medications as prescribed and tolerates them well. Patient denies current edema, chest pain, sob, palpitations, dizziness or syncope. Patient Active Problem List   Diagnosis    Diverticulitis    Abdominal pain, recurrent    Infarction of appendices epiploicae (HCC)    IBS (irritable bowel syndrome)    Fibromyalgia    Migraine    Ileus, postoperative    Myositis    Neal-Danlos syndrome    Factor V Leiden ([address]    Migraines    Overdose of benzodiazepine    Suicidal intent    Depression    Hypothyroidism    Chest pain    Abnormal nuclear stress test    SOB (shortness of breath)    Vitamin D deficiency    Hypertension    Hyperlipidemia    Cardiac syndrome X ([address]    Anxiety    History of coronary artery disease         Past Medical History:   has a past medical history of DDD (degenerative disc disease), Ehler's-Danlos syndrome, Factor V Leiden ([address] Fibromyalgia, Hyperlipidemia, Hypertension, IBS (irritable bowel syndrome), Intervertebral disc protrusion, Migraine, MRSA (methicillin resistant staph aureus) culture positive, MRSA nasal colonization, Nausea & vomiting, Psychiatric problem, Reflux, Thyroid disease, and Wears glasses. Surgical History:   has a past surgical history that includes Hysterectomy; Tonsillectomy;  Foot surgery; laparoscopy (3/16/2012); fracture surgery; Endoscopy, colon, diagnostic; Colonoscopy; eye surgery; Upper gastrointestinal endoscopy (9/1012); and Cardiac catheterization (2/28/2014). Social History:   reports that she has never smoked. She has never used smokeless tobacco. She reports that she does not currently use alcohol. She reports that she does not use drugs. Family History:  No evidence for sudden cardiac death or premature CAD    Home Medications:  Reviewed and are listed in nursing record. and/or listed below  Current Outpatient Medications   Medication Sig Dispense Refill    nitroGLYCERIN (NITROSTAT) 0.4 MG SL tablet PLACE ONE TABLET UNDER THE TONGUE EVERY 5 MINUTES AS NEEDED FOR CHEST PAIN. 25 tablet 2    pravastatin (PRAVACHOL) 10 MG tablet TAKE 1 TABLET BY MOUTH EVERY DAY 90 tablet 0    ranolazine (RANEXA) 1000 MG extended release tablet Take 1 tablet by mouth 2 times daily 180 tablet 3    amLODIPine (NORVASC) 2.5 MG tablet TAKE 1 TABLET BY MOUTH EVERY DAY 90 tablet 3    butalbital-acetaminophen-caffeine (FIORICET, ESGIC) -40 MG per tablet Take 1 tablet by mouth as needed for Headaches      morphine (MS CONTIN) 15 MG extended release tablet Take 15 mg by mouth 2 times daily. [place] tiZANidine (ZANAFLEX) 4 MG tablet Take 1 tablet by mouth nightly as needed  2    Cholecalciferol (VITAMIN D PO) Take by mouth      ondansetron (ZOFRAN ODT) 4 MG disintegrating tablet Take 1 tablet by mouth every 8 hours as needed for Nausea or Vomiting 20 tablet 0    [employer] ASPIRIN ADULT LOW DOSE 81 MG chewable tablet TAKE 1 TABLET BY MOUTH EVERY DAY 30 tablet 2    FLUoxetine (PROZAC) 20 MG capsule TAKE 1 CAPSULE BY MOUTH DAILY. 30 capsule 5    LYRICA 50 MG capsule TAKE ONE CAPSULE BY MOUTH DAILY  2    levothyroxine (SYNTHROID) 25 MCG tablet TAKE 1 TABLET BY MOUTH DAILY.  30 tablet 6    omeprazole (PRILOSEC) 40 MG capsule TAKE ONE CAPSULE EVERY DAY 30 capsule 0    ibuprofen (ADVIL;MOTRIN) 600 MG tablet Take 1 tablet by mouth 3 times daily. 120 tablet 0    Polyethylene Glycol 3350 (MIRALAX PO) Take  by mouth. clorazepate (TRANXENE) 7.5 MG tablet TAKE 1 TABLET BY MOUTH TWICE A DAY AS NEEDED FOR ANXIETY (Patient not taking: Reported on 7/14/2020) 60 tablet 0     No current facility-administered medications for this visit. Allergies:  Prochlorperazine edisylate, Neurontin [gabapentin], Other, Hydrocodone-acetaminophen, and Hydromorphone     Review of Systems:   All 12 point review of symptoms completed. Pertinent positives identified in the HPI, all other review of symptoms negative as below.     Objective:   PHYSICAL EXAM:    Vitals:    10/05/22 0947   BP: 114/72   Pulse: 74   SpO2: 97%      Weight: 166 lb (75.3 kg)     Wt Readings from Last 3 Encounters:   10/05/22 166 lb (75.3 kg)   08/19/21 166 lb (75.3 kg)   07/14/20 168 lb 3.2 oz (76.3 kg)       General Appearance:  Alert, cooperative, no distress, appears stated age   Head:  Normocephalic, atraumatic   Eyes:  PERRL, conjunctiva/corneas clear   Nose: Nares normal, no drainage or sinus tenderness   Throat: Lips, mucosa, and tongue normal   Neck: Supple, symmetrical, trachea midline, NL thyroid no carotid bruit or JVD   Lungs:   CTAB, respirations unlabored   Chest Wall:  No tenderness or deformity   Heart:  Regular rhythm and normal rate; S1, S2 are normal;   no murmur noted; no rub or gallop   Abdomen:   Soft, non-tender, +BS x 4, no masses, no organomegaly   Extremities: Extremities normal, atraumatic, no cyanosis or edema   Pulses: 2+ and symmetric   Skin: Skin color, texture, turgor normal, no rashes or lesions   Pysch: Normal mood and affect   Neurologic: Normal gross motor and sensory exam.         LABS   CBC:      Lab Results   Component Value Date/Time    WBC 8.0 03/20/2020 09:06 PM    RBC 4.30 03/20/2020 09:06 PM    HGB 13.3 03/20/2020 09:06 PM    HCT 39.2 03/20/2020 09:06 PM    MCV 91.2 03/20/2020 09:06 PM    RDW 13.0 03/20/2020 09:06 PM     03/20/2020 09:06 PM     CMP:  Lab Results   Component Value Date/Time     2020 09:06 PM    K 3.9 2020 09:06 PM    CL 99 2020 09:06 PM    CO2 27 2020 09:06 PM    BUN 18 2020 09:06 PM    CREATININE 0.9 2020 09:06 PM    GFRAA >60 2020 09:06 PM    GFRAA >60 04/15/2013 06:20 PM    AGRATIO 1.4 2020 09:06 PM    LABGLOM >60 2020 09:06 PM    GLUCOSE 117 2020 09:06 PM    PROT 7.0 2020 09:06 PM    PROT 7.8 2013 03:56 PM    CALCIUM 9.2 2020 09:06 PM    BILITOT <0.2 2020 09:06 PM    ALKPHOS 66 2020 09:06 PM    AST 13 2020 09:06 PM    ALT 13 2020 09:06 PM     PT/INR:   No components found for: PTPATIENT,  PTINR  Liver:  No components found for: CHLPL  Lab Results   Component Value Date    ALT 13 2020    AST 13 (L) 2020    ALKPHOS 66 2020    BILITOT <0.2 2020     No results found for: LABA1C  Lipids:         Lab Results   Component Value Date    TRIG 184 (H) 2019    TRIG 222 (H) 2018    TRIG 220 (H) 10/08/2015            Lab Results   Component Value Date    HDL 49 2019    HDL 51 2018    HDL 55 10/08/2015            Lab Results   Component Value Date    LDLCALC 136 (H) 2019    LDLCALC 151 (H) 2018    LDLCALC 131 (H) 10/08/2015            Lab Results   Component Value Date    LABVLDL 37 2019    LABVLDL 44 2018    LABVLDL 44 10/08/2015         CARDIAC DATA   EK2014 HR 76  Sinus Rhythm    /2022. Person interpreted  Sinus rhythm no ischemia injury or infarct    Echo 2021   Normal left ventricular size, wall thickness, and systolic function with an   estimated ejection fraction of 55-60%. No regional wall motion abnormalities are seen. Normal LV diastolic filling pressure. Normal RV size and systolic function. No hemodynamically significant valvular disease.     ECHO: 2018   Left ventricular systolic function is normal with ejection fraction estimated at 55%. No regional wall motion abnormalities. Normal left ventricular diastolic filling pressure. Systolic pulmonary artery pressure (SPAP) is normal estimated at 13 mmHg   (Right atrial pressure of 3 mmHg). STRESS TEST: 3/25/19  Summary Normal LV function. There is normal isotope uptake at stress and rest. There is no evidence of  myocardial ischemia or scar. CARDIAC CATH: 03/03/2014  ANGIOGRAPHIC FINDINGS:  1. Left main coronary artery was short, with no significant focal stenosis. It did appear to trifurcate into an LAD, ramus branch, and left circumflex. 2.  The LAD artery was a large vessel that ran in the intraventricular groove  before wrapping the apex distally. Had evidence of luminal irregularities  throughout its course, without significant focal stenosis. 3.  The ramus intermedius branch was also a very large vessel that ran on the  anterior aspect of the heart. It did branch in its distal aspects. There  was evidence of a tubular lesion about 20% in the proximal portion that was  not flow limiting. 4.  The left circumflex was a very large vessel, giving off a decent sized OM  branch, as well as several small PLOMs. There was a 20% to 30% lesion that  was noted in the proximal portion of the left circumflex. However, the  diagnostic catheter was deeply seated. After the administration of  sublingual nitroglycerin, this did improve, consistent with coronary  vasospasm. 5.  The right coronary artery was noted to be dominant. Had luminal  irregularities through out its course. There was slow CHRIS-2 flow throughout  the LAD, consistent with microvascular disease/endophyll dysfunction. ASSESSMENT AND PLAN:  At this time, the patient presents with nonobstructive  coronary artery disease as dictated above. She does have evidence of  coronary vasospasm in the left circumflex that is possibly catheter induced, that did improve with nitroglycerin. She has evidence of CHRIS-2 sluggish flow in the RCA, suggesting endothelial dysfunction. However, No significant stenosis requiring intervention at this time. We will go ahead and start her on Norvasc for her problems as above, and she will follow up with Dr. Ignacio Leslie for her symptomatology. VASCULAR/OTHER IMAGING:    CTA pulm: 10/09/14  FINDINGS: IV contrast was given using CTPA technique. No pulmonary emboli. No pleural effusions. No acute. No lung mass. There is a tiny liver cyst.        Assessment and Plan   Lenard Marina is a 45 y.o. female who presents today for the following problems:      1. CP/Prinzmetal's angina: Controlled, rare events   - secondary to coronary vasospasm vs endothelial dysfunction. 2. Nonobstructive CAD  3. HLD: intolerant to statins but confounded by fibromyalgia   -  Myalgias  With lipitor, doing ok on low dose pravastatin  4. HTN: controlled  5. History of Covid: December 2020  6. Preop: left elbow surgery        MD Plan:  1. Pt doing well with no CP and no coronary vasospasm   2. Ok to proceed with surgery at low risk   -Informed patient that we will need to watch for vasospasm during the surgery and notify anesthesia   -Continue Ranexa and Norvasc perioperatively  3. NEED LIPIDS! Patient is tolerating ultra low-dose pravastatin given fibromyalgia    - did not get labs as requested      Patient Plan:   1. Okay to proceed with elbow surgery at low risk. Has a history of vasospasm Monitor on telemetry   - The morning of the take norvasc, ranexa with a small sip of water  2. Continue current medications  3. Fasting lipids now  4. Follow up in 1 year      This note was scribed in the presence of Kyler Seymour MD by Denver Spivey RN.        Mj Whitlock MD, personally performed the services described in this documentation as scribed by the above signed scribe in my presence, and it is both accurate and complete to the best of our ability and knowledge.  I agree with the details independently gathered by my clinical support staff, while the remaining scribed note accurately describes my personal service to the patient. The above RN is working as a scribe for and in the presence of myself . Working as a scribe, the RN may have prepopulated components of this note with my historical intellectual property under my direct supervision. Any additions to this intellectual property were performed at my direction. Furthermore, the content and accuracy of this note have been reviewed by me to the best of my ability.        Deshaun Edwards MD, [facility]   [phone] [facility]  [phone] [address] [place] [address]  10/5/2022  10:01 AM

## 2022-10-04 DIAGNOSIS — R07.9 CHEST PAIN, UNSPECIFIED TYPE: Primary | ICD-10-CM

## 2022-10-05 ENCOUNTER — OFFICE VISIT (OUTPATIENT)
Dept: CARDIOLOGY CLINIC | Age: 52
End: 2022-10-05
Payer: MEDICARE

## 2022-10-05 ENCOUNTER — HOSPITAL ENCOUNTER (OUTPATIENT)
Age: 52
Discharge: HOME | End: 2022-10-05
Payer: MEDICARE

## 2022-10-05 VITALS
OXYGEN SATURATION: 97 % | HEART RATE: 74 BPM | WEIGHT: 166 LBS | SYSTOLIC BLOOD PRESSURE: 114 MMHG | BODY MASS INDEX: 32.59 KG/M2 | HEIGHT: 60 IN | DIASTOLIC BLOOD PRESSURE: 72 MMHG

## 2022-10-05 DIAGNOSIS — I20.1 PRINZMETAL ANGINA (HCC): Primary | ICD-10-CM

## 2022-10-05 DIAGNOSIS — I49.9 IRREGULAR HEART RATE: ICD-10-CM

## 2022-10-05 DIAGNOSIS — I10 PRIMARY HYPERTENSION: ICD-10-CM

## 2022-10-05 DIAGNOSIS — Z01.810 PREOP CARDIOVASCULAR EXAM: ICD-10-CM

## 2022-10-05 DIAGNOSIS — E78.2 MIXED HYPERLIPIDEMIA: ICD-10-CM

## 2022-10-05 LAB
CHOLESTEROL, TOTAL: 287 MG/DL (ref 0–199)
HDLC SERPL-MCNC: 70 MG/DL (ref 40–60)
LDL CHOLESTEROL CALCULATED: 171 MG/DL
TRIGL SERPL-MCNC: 228 MG/DL (ref 0–150)
VLDLC SERPL CALC-MCNC: 46 MG/DL

## 2022-10-05 PROCEDURE — G8417 CALC BMI ABV UP PARAM F/U: HCPCS | Performed by: INTERNAL MEDICINE

## 2022-10-05 PROCEDURE — 3017F COLORECTAL CA SCREEN DOC REV: CPT | Performed by: INTERNAL MEDICINE

## 2022-10-05 PROCEDURE — G8484 FLU IMMUNIZE NO ADMIN: HCPCS | Performed by: INTERNAL MEDICINE

## 2022-10-05 PROCEDURE — G8427 DOCREV CUR MEDS BY ELIG CLIN: HCPCS | Performed by: INTERNAL MEDICINE

## 2022-10-05 PROCEDURE — 93000 ELECTROCARDIOGRAM COMPLETE: CPT | Performed by: INTERNAL MEDICINE

## 2022-10-05 PROCEDURE — 1036F TOBACCO NON-USER: CPT | Performed by: INTERNAL MEDICINE

## 2022-10-05 PROCEDURE — 99214 OFFICE O/P EST MOD 30 MIN: CPT | Performed by: INTERNAL MEDICINE

## 2022-10-05 PROCEDURE — 80061 LIPID PANEL: CPT

## 2022-10-05 PROCEDURE — 36415 COLL VENOUS BLD VENIPUNCTURE: CPT

## 2022-10-05 RX ORDER — NITROGLYCERIN 0.4 MG/1
TABLET SUBLINGUAL
Qty: 25 TABLET | Refills: 2 | Status: SHIPPED | OUTPATIENT
Start: 2022-10-05

## 2022-10-05 NOTE — PATIENT INSTRUCTIONS
Patient Plan:   1. Okay to proceed with elbow surgery at low risk. Has a history of vasospasm Monitor on telemetry   - The morning of the take norvasc, ranexa with a small sip of water  2. Continue current medications  3. Fasting lipids now  4.  Follow up in 1 year

## 2022-10-05 NOTE — LETTER
27 yo F w/ PMHx currently 25w3d pregnant with hx of ETOH abuse (recent admission for intoxication), benzo w/d seizures, depression p/w nausea/vomiting/abd pain x 4 days.    # nausea/vomiting - concerning for EtOH vs benzo withdrawal; reports last drink 3/29/21 or 4/2 to different providers  -GI recs appreciated  -Zofran, Reglan, pantoprazole are acceptable anti-emetics  -can give Ativan/benzodiazepine as needed, currently on ativan taper  -patient will need rehab follow up as she is motivated to quit alcohol use but has significant stressors and triggers in her life. Recommend inpatient rehab but patient is declining stating that she would like an outpatient option in [place]. She is motivated to quit. She would like to go to AA.    #transaminitis  -elevated AST/ALT compared to baseline, downtrending now 73/187 likely related to alcohol use  -Hepatology following: will rule out acute viral hepatitis (unlikely) and biliary pathology, abdominal ultrasound unremarkable and abdominal doppler did not show evidence of portal vein thrombosis    #FWB  ATU(4/5):complete breech, anterior, MVP 6.5, BPP 8/8  -MFM to follow    BRIDGETT Bain PGY-3  [id number]   [address] Cardiology - [address] [place] [id number] [facility]  Phone: [phone]  Fax: [phone]    Daryle Brocks, MD        October 5, 2022     Patient: Grace De Leon   YOB: 1970   Date of Visit: 10/5/2022       To Whom It May Concern:    Abida Plan Phillip  proceed with elbow surgery at low cardiac risk. She has a history of vasospasm and should be monitored on telemetry during procedure. Take Norvasc and ranexa the morning of the procedure with a small sip of water. If you have any questions or concerns, please don't hesitate to call.     Sincerely,        Daryle Brocks, MD 27 yo F w/ PMHx currently 25w3d pregnant with hx of ETOH abuse (recent admission for intoxication), benzo w/d seizures, depression p/w nausea/vomiting/abd pain x 4 days.    # nausea/vomiting - concerning for EtOH vs benzo withdrawal; reports last drink 3/29/21 or 4/2 to different providers  -GI recs appreciated  -Zofran, Reglan, pantoprazole are acceptable anti-emetics  -can give Ativan/benzodiazepine as needed, currently on ativan taper  -patient will need rehab follow up as she is motivated to quit alcohol use but has significant stressors and triggers in her life. Recommend inpatient rehab but patient is declining stating that she would like an outpatient option in [place]. She is motivated to quit. She would like to go to .    #transaminitis  -elevated AST/ALT compared to baseline, downtrending now 73/187 likely related to alcohol use  -Hepatology following: will rule out acute viral hepatitis (unlikely) and biliary pathology, abdominal ultrasound unremarkable and abdominal doppler did not show evidence of portal vein thrombosis    #FWB  ATU(4/5):complete breech, anterior, MVP 6.5, BPP 8/8  -MFM to follow    Please d/c Ceftriaxone, UCx is contaminated. Patient does not have clinical or laboratory signs of UTI.    BRIDGETT Bain PGY-3  [id number]   27 yo F w/ PMHx currently 25w3d pregnant with hx of ETOH abuse (recent admission for intoxication), benzo w/d seizures, depression p/w nausea/vomiting/abd pain x 4 days.    # nausea/vomiting - concerning for EtOH vs benzo withdrawal; reports last drink 3/29/21 or 4/2 to different providers  -GI recs appreciated  -Zofran, Reglan, pantoprazole are acceptable anti-emetics  -can give Ativan/benzodiazepine as needed, currently on ativan taper  -patient will need rehab follow up as she is motivated to quit alcohol use but has significant stressors and triggers in her life. Recommend inpatient rehab but patient is declining stating that she would like an outpatient option in [place]. She is motivated to quit. She would like to go to .    #transaminitis  -elevated AST/ALT compared to baseline, downtrending now 73/187 likely related to alcohol use  -Hepatology following: will rule out acute viral hepatitis (unlikely) and biliary pathology, abdominal ultrasound unremarkable and abdominal doppler did not show evidence of portal vein thrombosis    #FWB  ATU(4/5):complete breech, anterior, MVP 6.5, BPP 8/8  -MFM to follow    Please d/c Ceftriaxone, UCx is contaminated. She should have urine culture repeated. Patient does not have clinical or laboratory signs of UTI.    BRIDGETT Bain PGY-3  [id number]   25 yo F w/ PMHx currently 25w3d pregnant with hx of ETOH abuse (recent admission for intoxication), benzo w/d seizures, depression p/w nausea/vomiting/abd pain x 4 days.    # nausea/vomiting - concerning for EtOH vs benzo withdrawal; reports last drink 3/29/21 or 4/2 to different providers  -GI recs appreciated  -Zofran, Reglan, pantoprazole are acceptable anti-emetics  -can give Ativan/benzodiazepine as needed, currently on ativan taper  -patient will need rehab follow up as she is motivated to quit alcohol use but has significant stressors and triggers in her life. Recommend inpatient rehab but patient is declining stating that she would like an outpatient option in [place]. She is motivated to quit. She would like to go to .    #transaminitis  -elevated AST/ALT compared to baseline, downtrending now 73/187 likely related to alcohol use  -Hepatology following: will rule out acute viral hepatitis (unlikely) and biliary pathology, abdominal ultrasound unremarkable and abdominal doppler did not show evidence of portal vein thrombosis    #FWB  ATU(4/5):complete breech, anterior, MVP 6.5, BPP 8/8  -MFM to follow    Consider d/c Ceftriaxone, UCx is contaminated. She should have urine culture repeated. Patient does not have clinical or laboratory signs of UTI at this time.    BRIDGETT Bain PGY-3  [id number]    ***[facility] ADDENDUM***  Patient seen at bedside w/ MFM attending Dr. Rodríguez. Agree with above.  Patient reports feeling better, able to tolerate PO w/o nausea or vomiting, no obstetric complaints.  We had a long discussion and provided extensive counseling regarding the importance of rehabilitation and alcohol cessation, as well as the utility of maintenance anxiolytics. Patient unable to go back to [facility] as she has moved to [place], she spoke with  and has outpatient rehabilitation set up for Monday at 3pm. She reports her ativan taper ends tomorrow and that the plan is for discharge tomorrow as well. I spoke with the patient regarding her motivation for alcohol cessation as she last expressed concern regarding being discharged home on a weekend due to risk of relapse with alcohol use. She states now that she has outpatient rehab scheduled she feels better about going home on a weekend. She will continue prenatal care at our high risk pregnancy clinic, will coordinate for f/u visit on Monday.

## 2022-10-06 ENCOUNTER — TELEPHONE (OUTPATIENT)
Dept: CARDIOLOGY CLINIC | Age: 52
End: 2022-10-06

## 2022-10-06 DIAGNOSIS — E78.2 MIXED HYPERLIPIDEMIA: Primary | ICD-10-CM

## 2022-10-07 RX ORDER — EVOLOCUMAB 140 MG/ML
140 INJECTION, SOLUTION SUBCUTANEOUS
Qty: 1 ADJUSTABLE DOSE PRE-FILLED PEN SYRINGE | Refills: 12 | Status: CANCELLED | OUTPATIENT
Start: 2022-10-07

## 2022-10-21 RX ORDER — AMLODIPINE BESYLATE 2.5 MG/1
TABLET ORAL
Qty: 90 TABLET | Refills: 3 | Status: SHIPPED | OUTPATIENT
Start: 2022-10-21

## 2022-10-21 RX ORDER — PRAVASTATIN SODIUM 10 MG
TABLET ORAL
Qty: 90 TABLET | Refills: 3 | Status: SHIPPED | OUTPATIENT
Start: 2022-10-21

## 2023-01-31 DIAGNOSIS — R07.9 CHEST PAIN, UNSPECIFIED TYPE: ICD-10-CM

## 2023-02-01 RX ORDER — RANOLAZINE 1000 MG/1
TABLET, EXTENDED RELEASE ORAL
Qty: 180 TABLET | Refills: 3 | Status: SHIPPED | OUTPATIENT
Start: 2023-02-01

## 2023-09-15 ENCOUNTER — TELEPHONE (OUTPATIENT)
Dept: CARDIOLOGY CLINIC | Age: 53
End: 2023-09-15

## 2023-09-15 DIAGNOSIS — E78.2 MIXED HYPERLIPIDEMIA: Primary | ICD-10-CM

## 2023-09-15 NOTE — TELEPHONE ENCOUNTER
Severo Long from [address] [place] [address] called for an updated ov note and lipid panel to renew the prior authorization for repatha. The last they have is from 10/05/22 [facility]. Severo Long was advised that pt has not been in since. 9/15 left vm for pt to call [facility] in regards to scheduling a yearly ov.

## 2023-09-15 NOTE — TELEPHONE ENCOUNTER
09/5/2023-Called ([phone]) unable to make contact, LM.  Former [facility] pt, needs to be seen w/interventional cardiology ([address] [place] [address], [address] [place] [address] or [facility])

## 2023-09-27 NOTE — TELEPHONE ENCOUNTER
PT returned call. RS ov to 11/06/23. PT stated she will be out of [facility] in two weeks and wants to know what she should do?  PT ph#[phone]

## 2023-10-03 NOTE — TELEPHONE ENCOUNTER
Spoke with pt. She reports she need updated labs. Order placed for repatha protocol.  Next OV FXW 11/6/2023

## 2023-10-11 ENCOUNTER — TELEPHONE (OUTPATIENT)
Dept: CARDIOLOGY CLINIC | Age: 53
End: 2023-10-11

## 2023-10-11 NOTE — TELEPHONE ENCOUNTER
Received refill request from community pharmacy for repatha [facility] 140/mg/ml, inject 1 pen under the skin once every 14 days. Sheila Londono is not on pts medication list. Attempted to contact pt to see if she is taking repatha. LMOVM for pt to call the office.

## 2023-11-07 ENCOUNTER — OFFICE VISIT (OUTPATIENT)
Dept: CARDIOLOGY CLINIC | Age: 53
End: 2023-11-07
Payer: MEDICARE

## 2023-11-07 ENCOUNTER — HOSPITAL ENCOUNTER (OUTPATIENT)
Age: 53
Discharge: HOME | End: 2023-11-07
Payer: MEDICARE

## 2023-11-07 ENCOUNTER — TELEPHONE (OUTPATIENT)
Dept: CARDIOLOGY CLINIC | Age: 53
End: 2023-11-07

## 2023-11-07 VITALS
OXYGEN SATURATION: 98 % | DIASTOLIC BLOOD PRESSURE: 82 MMHG | WEIGHT: 174.5 LBS | BODY MASS INDEX: 34.26 KG/M2 | HEART RATE: 82 BPM | HEIGHT: 60 IN | SYSTOLIC BLOOD PRESSURE: 128 MMHG

## 2023-11-07 DIAGNOSIS — I20.0 UNSTABLE ANGINA (HCC): ICD-10-CM

## 2023-11-07 DIAGNOSIS — E78.2 MIXED HYPERLIPIDEMIA: ICD-10-CM

## 2023-11-07 DIAGNOSIS — Z00.00 ROUTINE CHECK-UP: Primary | ICD-10-CM

## 2023-11-07 DIAGNOSIS — R06.02 SHORTNESS OF BREATH: ICD-10-CM

## 2023-11-07 DIAGNOSIS — Z86.79 HISTORY OF CORONARY ARTERY DISEASE: ICD-10-CM

## 2023-11-07 LAB
CHOLEST SERPL-MCNC: 262 MG/DL (ref 0–199)
HDLC SERPL-MCNC: 61 MG/DL (ref 40–60)
LDLC SERPL CALC-MCNC: 155 MG/DL
TRIGL SERPL-MCNC: 229 MG/DL (ref 0–150)
VLDLC SERPL CALC-MCNC: 46 MG/DL

## 2023-11-07 PROCEDURE — 80061 LIPID PANEL: CPT

## 2023-11-07 PROCEDURE — G8484 FLU IMMUNIZE NO ADMIN: HCPCS | Performed by: INTERNAL MEDICINE

## 2023-11-07 PROCEDURE — 99214 OFFICE O/P EST MOD 30 MIN: CPT | Performed by: INTERNAL MEDICINE

## 2023-11-07 PROCEDURE — 93000 ELECTROCARDIOGRAM COMPLETE: CPT | Performed by: INTERNAL MEDICINE

## 2023-11-07 PROCEDURE — 3079F DIAST BP 80-89 MM HG: CPT | Performed by: INTERNAL MEDICINE

## 2023-11-07 PROCEDURE — 1036F TOBACCO NON-USER: CPT | Performed by: INTERNAL MEDICINE

## 2023-11-07 PROCEDURE — 36415 COLL VENOUS BLD VENIPUNCTURE: CPT

## 2023-11-07 PROCEDURE — 3017F COLORECTAL CA SCREEN DOC REV: CPT | Performed by: INTERNAL MEDICINE

## 2023-11-07 PROCEDURE — 3074F SYST BP LT 130 MM HG: CPT | Performed by: INTERNAL MEDICINE

## 2023-11-07 PROCEDURE — G8417 CALC BMI ABV UP PARAM F/U: HCPCS | Performed by: INTERNAL MEDICINE

## 2023-11-07 PROCEDURE — G8427 DOCREV CUR MEDS BY ELIG CLIN: HCPCS | Performed by: INTERNAL MEDICINE

## 2023-11-07 RX ORDER — EVOLOCUMAB 140 MG/ML
140 INJECTION, SOLUTION SUBCUTANEOUS
COMMUNITY

## 2023-11-07 NOTE — PROGRESS NOTES
[address] - Progress/Follow-up Note        REASON FOR FOLLOW UP  No chief complaint on file. INTERVAL HISTORY  Ms. Sheryle Leather is a 52 y.o. female presenting for follow up. She previously followed with Dr. Ghulam Garcia. She had a medical history of of Factor V leiden, HLD, HTN, Ehler's Danlos syndrome. She was admitted to [facility] for chest pain. At that time pt was negative for ischemia on cardiac enzymes and ECG. + FMHx. At that time she had fevers and was s/p cervical spine injections so cath was delayed. As outpt continued to have OLIVAS and CP. Abnormal stress test and now recent stress test was abnormal for probable inferolateral and apical scar with no reversibility seen. Cath showed nonobstructive CAD with evidence for possible spasm of LCx and CHRIS-II flow in RCA c/w endothelial dysfunction. Norvasc started. Last visit with Dr. Ghulam Garcia, 10/5/2022. She presented for cardiac clearance for elbow surgery. Today, 11/6/2023, ***        Patient denies chest pain/heaviness/pressure, palpitations, dyspnea, orthopnea, edema, lightheadedness, syncope. REVIEW OF SYSTEMS  10 point ROS done and negative other than HPI      Details of patient's medical, family and social history reviewed and updated as necessary. CURRENT CARDIAC MEDICATIONS  Norvasc 2.5  Pravastatin 10  Ranexa 1000 BID      VITALS  There were no vitals taken for this visit.   No intake or output data in the 24 hours ending 11/07/23 0702    PHYSICAL EXAM  General appearance - alert, cooperative, no distress, appears stated age  Neck - Supple, symmetrical, trachea midline, no adenopathy, thyroid: not enlarged, symmetric, no tenderness/mass/nodules, no carotid bruit or JVD  Lungs - Clear to auscultation bilaterally, respirations unlabored  Chest wall - No tenderness or deformity  Heart - Regular rate and rhythm, S1, S2 normal, no murmur, no rub or gallop  Abdomen - Soft, non-tender, bowel sounds active all four quadrants,  no masses, no

## 2023-11-07 NOTE — PATIENT INSTRUCTIONS
PLAN:  Angiogram- you will hear from our  Anne to schedule this procedure. Continue current cardiac medications  Echo- to check for heart size and function, same day as cardiac cath  Repatha- Get your lipids checked     Your provider has ordered testing for further evaluation. An order/prescription has been included in your paper work. To schedule outpatient testing, contact Central Scheduling by calling [employer] ([phone]).     Follow up after procedure

## 2023-11-07 NOTE — TELEPHONE ENCOUNTER
Dr. Va Boucher would like for patient to be scheduled for heart cath with Echo same day. Can you please help schedule this. Thank you.

## 2023-11-14 NOTE — TELEPHONE ENCOUNTER
Procedure:  Echo/[facility]  Doctor:  Dr. Deanna Harkins  Date:  11/22/23  Time:  9am  Arrival:  7am  Reps:  n/a  Anesthesia:  n/a      Spoke with patient. Please have patient arrive to the main entrance of [address] [place] [address] ([address] [place] [address], [address] [facility]) and check in with the registration desk. They will be directed to the Cath Lab. Please call patient regarding medication instructions. Remind patient to be NPO after midnight (8 hours prior). Do not apply lotions/creams on skin the day of procedure.

## 2023-11-22 ENCOUNTER — HOSPITAL ENCOUNTER (OUTPATIENT)
Dept: CARDIAC CATH/INVASIVE PROCEDURES | Age: 53
Discharge: HOME | End: 2023-11-22
Attending: INTERNAL MEDICINE | Admitting: INTERNAL MEDICINE
Payer: MEDICAID

## 2023-11-22 ENCOUNTER — HOSPITAL ENCOUNTER (OUTPATIENT)
Dept: NON INVASIVE DIAGNOSTICS | Age: 53
Discharge: HOME | End: 2023-11-22
Attending: INTERNAL MEDICINE
Payer: MEDICAID

## 2023-11-22 VITALS
DIASTOLIC BLOOD PRESSURE: 69 MMHG | WEIGHT: 177.7 LBS | OXYGEN SATURATION: 96 % | HEIGHT: 60 IN | RESPIRATION RATE: 13 BRPM | BODY MASS INDEX: 34.89 KG/M2 | SYSTOLIC BLOOD PRESSURE: 102 MMHG | HEART RATE: 74 BPM

## 2023-11-22 LAB
ANION GAP SERPL CALCULATED.3IONS-SCNC: 11 MMOL/L (ref 3–16)
BUN SERPL-MCNC: 15 MG/DL (ref 7–20)
CALCIUM SERPL-MCNC: 8.9 MG/DL (ref 8.3–10.6)
CHLORIDE SERPL-SCNC: 106 MMOL/L (ref 99–110)
CHOLEST SERPL-MCNC: 256 MG/DL (ref 0–199)
CO2 SERPL-SCNC: 25 MMOL/L (ref 21–32)
CREAT SERPL-MCNC: 1.1 MG/DL (ref 0.6–1.1)
DEPRECATED RDW RBC AUTO: 14.9 % (ref 12.4–15.4)
EKG ATRIAL RATE: 75 BPM
EKG DIAGNOSIS: NORMAL
EKG P AXIS: 41 DEGREES
EKG P-R INTERVAL: 130 MS
EKG Q-T INTERVAL: 370 MS
EKG QRS DURATION: 90 MS
EKG QTC CALCULATION (BAZETT): 413 MS
EKG R AXIS: -8 DEGREES
EKG T AXIS: 38 DEGREES
EKG VENTRICULAR RATE: 75 BPM
GFR SERPLBLD CREATININE-BSD FMLA CKD-EPI: 59 ML/MIN/{1.73_M2}
GLUCOSE SERPL-MCNC: 103 MG/DL (ref 70–99)
HCT VFR BLD AUTO: 37.8 % (ref 36–48)
HDLC SERPL-MCNC: 55 MG/DL (ref 40–60)
HGB BLD-MCNC: 12.5 G/DL (ref 12–16)
INR PPP: 0.84 (ref 0.84–1.16)
LDLC SERPL CALC-MCNC: 152 MG/DL
MCH RBC QN AUTO: 28.4 PG (ref 26–34)
MCHC RBC AUTO-ENTMCNC: 33.1 G/DL (ref 31–36)
MCV RBC AUTO: 85.9 FL (ref 80–100)
PLATELET # BLD AUTO: 333 K/UL (ref 135–450)
PMV BLD AUTO: 6.4 FL (ref 5–10.5)
POTASSIUM SERPL-SCNC: 3.9 MMOL/L (ref 3.5–5.1)
PROTHROMBIN TIME: 11.5 SEC (ref 11.5–14.8)
RBC # BLD AUTO: 4.4 M/UL (ref 4–5.2)
SODIUM SERPL-SCNC: 142 MMOL/L (ref 136–145)
TRIGL SERPL-MCNC: 243 MG/DL (ref 0–150)
VLDLC SERPL CALC-MCNC: 49 MG/DL
WBC # BLD AUTO: 7.6 K/UL (ref 4–11)

## 2023-11-22 PROCEDURE — 2500000003 HC RX 250 WO HCPCS

## 2023-11-22 PROCEDURE — 93458 L HRT ARTERY/VENTRICLE ANGIO: CPT

## 2023-11-22 PROCEDURE — 80048 BASIC METABOLIC PNL TOTAL CA: CPT

## 2023-11-22 PROCEDURE — 85610 PROTHROMBIN TIME: CPT

## 2023-11-22 PROCEDURE — 85027 COMPLETE CBC AUTOMATED: CPT

## 2023-11-22 PROCEDURE — C1894 INTRO/SHEATH, NON-LASER: HCPCS | Performed by: INTERNAL MEDICINE

## 2023-11-22 PROCEDURE — 2709999900 HC NON-CHARGEABLE SUPPLY: Performed by: INTERNAL MEDICINE

## 2023-11-22 PROCEDURE — C1769 GUIDE WIRE: HCPCS | Performed by: INTERNAL MEDICINE

## 2023-11-22 PROCEDURE — 80061 LIPID PANEL: CPT

## 2023-11-22 PROCEDURE — 6360000002 HC RX W HCPCS

## 2023-11-22 PROCEDURE — 93005 ELECTROCARDIOGRAM TRACING: CPT | Performed by: INTERNAL MEDICINE

## 2023-11-22 PROCEDURE — 93306 TTE W/DOPPLER COMPLETE: CPT

## 2023-11-22 PROCEDURE — 6370000000 HC RX 637 (ALT 250 FOR IP)

## 2023-11-22 RX ORDER — ONDANSETRON 2 MG/ML
4 INJECTION INTRAMUSCULAR; INTRAVENOUS EVERY 6 HOURS PRN
Status: DISCONTINUED | OUTPATIENT
Start: 2023-11-22 | End: 2023-11-22 | Stop reason: HOSPADM

## 2023-11-22 RX ORDER — FENTANYL CITRATE 50 UG/ML
INJECTION, SOLUTION INTRAMUSCULAR; INTRAVENOUS
Status: COMPLETED | OUTPATIENT
Start: 2023-11-22 | End: 2023-11-22

## 2023-11-22 RX ORDER — SODIUM CHLORIDE 0.9 % (FLUSH) 0.9 %
5-40 SYRINGE (ML) INJECTION PRN
Status: DISCONTINUED | OUTPATIENT
Start: 2023-11-22 | End: 2023-11-22 | Stop reason: HOSPADM

## 2023-11-22 RX ORDER — SODIUM CHLORIDE 0.9 % (FLUSH) 0.9 %
5-40 SYRINGE (ML) INJECTION EVERY 12 HOURS SCHEDULED
Status: DISCONTINUED | OUTPATIENT
Start: 2023-11-22 | End: 2023-11-22 | Stop reason: HOSPADM

## 2023-11-22 RX ORDER — ACETAMINOPHEN 325 MG/1
650 TABLET ORAL EVERY 4 HOURS PRN
Status: DISCONTINUED | OUTPATIENT
Start: 2023-11-22 | End: 2023-11-22 | Stop reason: HOSPADM

## 2023-11-22 RX ORDER — SODIUM CHLORIDE 9 MG/ML
INJECTION, SOLUTION INTRAVENOUS PRN
Status: DISCONTINUED | OUTPATIENT
Start: 2023-11-22 | End: 2023-11-22 | Stop reason: HOSPADM

## 2023-11-22 RX ORDER — MIDAZOLAM HYDROCHLORIDE 1 MG/ML
INJECTION INTRAMUSCULAR; INTRAVENOUS
Status: COMPLETED | OUTPATIENT
Start: 2023-11-22 | End: 2023-11-22

## 2023-11-22 RX ORDER — HEPARIN SODIUM 1000 [USP'U]/ML
INJECTION, SOLUTION INTRAVENOUS; SUBCUTANEOUS
Status: COMPLETED | OUTPATIENT
Start: 2023-11-22 | End: 2023-11-22

## 2023-11-22 RX ORDER — ASPIRIN 81 MG/1
81 TABLET, CHEWABLE ORAL ONCE
Status: COMPLETED | OUTPATIENT
Start: 2023-11-22 | End: 2023-11-22

## 2023-11-22 RX ORDER — ASPIRIN 325 MG
325 TABLET ORAL ONCE
Status: DISCONTINUED | OUTPATIENT
Start: 2023-11-22 | End: 2023-11-22

## 2023-11-22 RX ORDER — LORAZEPAM 0.5 MG/1
0.5 TABLET ORAL
Status: DISCONTINUED | OUTPATIENT
Start: 2023-11-22 | End: 2023-11-22 | Stop reason: HOSPADM

## 2023-11-22 RX ADMIN — ASPIRIN 81 MG: 81 TABLET, CHEWABLE ORAL at 07:52

## 2023-11-22 RX ADMIN — MIDAZOLAM HYDROCHLORIDE 1 MG: 1 INJECTION INTRAMUSCULAR; INTRAVENOUS at 09:39

## 2023-11-22 RX ADMIN — HEPARIN SODIUM 4000 UNITS: 1000 INJECTION, SOLUTION INTRAVENOUS; SUBCUTANEOUS at 09:38

## 2023-11-22 RX ADMIN — MIDAZOLAM HYDROCHLORIDE 1 MG: 1 INJECTION INTRAMUSCULAR; INTRAVENOUS at 09:32

## 2023-11-22 RX ADMIN — FENTANYL CITRATE 25 MCG: 50 INJECTION, SOLUTION INTRAMUSCULAR; INTRAVENOUS at 09:39

## 2023-11-22 RX ADMIN — FENTANYL CITRATE 50 MCG: 50 INJECTION, SOLUTION INTRAMUSCULAR; INTRAVENOUS at 09:32

## 2023-11-22 NOTE — PRE SEDATION
Sedation Pre-Procedure Note    Patient Name: Eugene Casey   YOB: 1970  Room/Bed: Cath Pool Rm/NONE  Medical Record Number: [id number]  Date: 11/22/2023   Time: 9:02 AM       Indication:  unstable angina    Consent: I have discussed with the patient and/or the patient representative the indication, alternatives, and the possible risks and/or complications of the planned procedure and the anesthesia methods. The patient and/or patient representative appear to understand and agree to proceed. Vital Signs:   Vitals:    11/22/23 0732   BP: (!) 136/91   Pulse:    SpO2: 96%       Past Medical History:   has a past medical history of DDD (degenerative disc disease), Ehler's-Danlos syndrome, Factor V Leiden ([address]), Fibromyalgia, Hyperlipidemia, Hypertension, IBS (irritable bowel syndrome), Intervertebral disc protrusion, Migraine, MRSA (methicillin resistant staph aureus) culture positive, MRSA nasal colonization, Nausea & vomiting, Psychiatric problem, Reflux, Thyroid disease, and Wears glasses. Past Surgical History:   has a past surgical history that includes Hysterectomy; Tonsillectomy; Foot surgery; laparoscopy (3/16/2012); fracture surgery; Endoscopy, colon, diagnostic; Colonoscopy; eye surgery; Upper gastrointestinal endoscopy (9/1012); and Cardiac catheterization (2/28/2014). Medications:   Scheduled Meds:    sodium chloride flush  5-40 mL IntraVENous 2 times per day     Continuous Infusions:    sodium chloride       PRN Meds: sodium chloride flush, sodium chloride, ondansetron, LORazepam  Home Meds:   Prior to Admission medications    Medication Sig Start Date End Date Taking?  Authorizing Provider   Evolocumab (REPATHA) SOSY syringe Inject 1 mL into the skin every 14 days    ProviderMomo MD   ranolazine (RANEXA) 1000 MG extended release tablet TAKE 1 TABLET BY MOUTH TWICE A DAY 2/1/23   Gabriella Barton MD   amLODIPine (NORVASC) 2.5 MG tablet TAKE 1 TABLET BY MOUTH EVERY DAY 10/21/22

## 2023-11-22 NOTE — DISCHARGE INSTRUCTIONS
FOLLOW-UP APPOINTMENTS    Follow-up appointment on 12/20/2023 at 1030AM with Jean-Claude Beach CNP. [address] [facility] [address]: [phone]. If you are unable to make this appointment, please call to reschedule [phone]. Please arrive 15 minutes early to complete necessary paperwork. Directions to [place] towards [place]. [address] exit. Right off exit. Cross over [employer]. Right on [address]. Left into hospital. Follow the signs to the emergency room ( turn left toward the Emergency room). Go right at the first stop sign. Just past the Emergency room at the second stop sign turn right and go up the ramp and park on the top level if possible. Go in the glass doors of the [facility] we on the top level of the garage [address]. As soon as you get in the door turn left and our office is the one with the glass doors. Cath Labs at  [facility]   Discharge Instructions        11/22/2023  Rahelfany Mantillagloria   Date of Birth 1970       Activity:  No driving for 24 hours. In 24 hours you may remove dressing and shower, wash site gently with soap and water and leave open to air  Avoid submerging your arm in sitting water for 5 days. Do not use your right hand for 24 hours, then  No lifting more than 5 pounds for 5 days. No lotions, powders, or ointments near site for 5 days. No work/school for 5 days unless instructed otherwise by your cardiologist.    Diet:   Resume previous diet, if a cardiac diet is specified you will receive a handout with  general guidelines. Drink extra non-alcoholic/decaffienated fluids for first 24 hours after your procedure.     Arm Management:  If bleeding occurs from the site or a hematoma (lump) begins to increase in size, apply pressure directly over the site, call 911 to return to the hospital.    Special Instructions:  Report any coolness or numbness in the arm  Report any chills, fever, itching, red bumps or rash   Report any of the

## 2023-11-22 NOTE — PROCEDURES
continuous hemodynamic monitoring      COMPLICATIONS: None      BLOOD LOSS: 20 cc      FINAL DIAGNOSIS  Near normal coronaries suggesting likely noncardiac cause of symptoms    PLAN/RECOMMENDATIONS  Workup noncardiac causes of symptom  Resume routine follow-up with PCP and outpatient cardiology        Tre Morton MD, [facility] Cardiology  [facility]  [phone] (c)

## 2024-01-10 DIAGNOSIS — I10 PRIMARY HYPERTENSION: Primary | ICD-10-CM

## 2024-01-10 RX ORDER — AMLODIPINE BESYLATE 2.5 MG/1
2.5 TABLET ORAL DAILY
Qty: 90 TABLET | Refills: 3 | Status: SHIPPED | OUTPATIENT
Start: 2024-01-10

## 2024-03-18 DIAGNOSIS — R07.9 CHEST PAIN, UNSPECIFIED TYPE: ICD-10-CM

## 2024-03-18 NOTE — TELEPHONE ENCOUNTER
03.18 called pt @ [phone] (Home Phone) and lvm to return call to schedule annual fxw appt, will try again at later time

## 2024-03-18 NOTE — TELEPHONE ENCOUNTER
Please scheduled yearly OV with FXW. Route back for refills. Thanks     MARILUZ SINGH 11/7/2023   PNA (pneumonia)

## 2024-03-22 RX ORDER — RANOLAZINE 1000 MG/1
TABLET, EXTENDED RELEASE ORAL
Qty: 180 TABLET | Refills: 0 | Status: SHIPPED | OUTPATIENT
Start: 2024-03-22

## 2024-05-22 NOTE — PROGRESS NOTES
changes  Recommend a cardiac healthy diet (low salt, avoid red meat, avoid fatty or fried foods, lots of fruits and vegetables) as well as regular moderate intensity activity for 30 minutes per day 3-5 times per week.   Follow up in 1 year    I, Gagandeep Mccain, personally performed the services described in this documentation, as scribed by Geovanna Ross RN. in my presence. It is both accurate and complete to my knowledge.  I agree with the details independently gathered by the clinical support staff and the scribed note accurately describes my personal service to the patient.    Tobacco use, if applicable, was discussed with the patient and educated on the negative effects.    All questions and concerns were addressed to the patient/family. Alternatives to my treatment as well as risks and benefits of proposed treatment were discussed and understood by patient/family.     Gagandeep Mccain MD, FACC, [facility]  Interventional Cardiology  [facility]  [phone] (c)  5/22/2024      Inadvertent computerized transcription errors may be present

## 2024-05-24 ENCOUNTER — OFFICE VISIT (OUTPATIENT)
Dept: CARDIOLOGY CLINIC | Age: 54
End: 2024-05-24
Payer: MEDICARE

## 2024-05-24 VITALS
DIASTOLIC BLOOD PRESSURE: 74 MMHG | HEART RATE: 77 BPM | BODY MASS INDEX: 33.77 KG/M2 | SYSTOLIC BLOOD PRESSURE: 126 MMHG | HEIGHT: 60 IN | OXYGEN SATURATION: 97 % | WEIGHT: 172 LBS

## 2024-05-24 DIAGNOSIS — R07.9 CHEST PAIN, UNSPECIFIED TYPE: ICD-10-CM

## 2024-05-24 DIAGNOSIS — I10 PRIMARY HYPERTENSION: Primary | ICD-10-CM

## 2024-05-24 DIAGNOSIS — R06.02 SOB (SHORTNESS OF BREATH): ICD-10-CM

## 2024-05-24 DIAGNOSIS — E78.2 MIXED HYPERLIPIDEMIA: ICD-10-CM

## 2024-05-24 PROCEDURE — G8417 CALC BMI ABV UP PARAM F/U: HCPCS | Performed by: INTERNAL MEDICINE

## 2024-05-24 PROCEDURE — 1036F TOBACCO NON-USER: CPT | Performed by: INTERNAL MEDICINE

## 2024-05-24 PROCEDURE — 3017F COLORECTAL CA SCREEN DOC REV: CPT | Performed by: INTERNAL MEDICINE

## 2024-05-24 PROCEDURE — G8427 DOCREV CUR MEDS BY ELIG CLIN: HCPCS | Performed by: INTERNAL MEDICINE

## 2024-05-24 PROCEDURE — 3078F DIAST BP <80 MM HG: CPT | Performed by: INTERNAL MEDICINE

## 2024-05-24 PROCEDURE — 99214 OFFICE O/P EST MOD 30 MIN: CPT | Performed by: INTERNAL MEDICINE

## 2024-05-24 PROCEDURE — 3074F SYST BP LT 130 MM HG: CPT | Performed by: INTERNAL MEDICINE

## 2024-05-24 RX ORDER — EVOLOCUMAB 140 MG/ML
140 INJECTION, SOLUTION SUBCUTANEOUS
Qty: 2 ADJUSTABLE DOSE PRE-FILLED PEN SYRINGE | Refills: 11 | Status: SHIPPED | OUTPATIENT
Start: 2024-05-24

## 2024-05-24 NOTE — PATIENT INSTRUCTIONS
PLAN:  Continue current medications  Recommend a cardiac healthy diet (low salt, avoid red meat, avoid fatty or fried foods, lots of fruits and vegetables) as well as regular moderate intensity activity for 30 minutes per day 3-5 times per week.   Follow up in 1 year

## 2024-06-24 ENCOUNTER — HOSPITAL ENCOUNTER (EMERGENCY)
Age: 54
Discharge: HOME | End: 2024-06-24
Payer: MEDICARE

## 2024-06-24 VITALS
RESPIRATION RATE: 14 BRPM | OXYGEN SATURATION: 96 % | HEART RATE: 72 BPM | HEIGHT: 60 IN | WEIGHT: 165 LBS | TEMPERATURE: 97.8 F | DIASTOLIC BLOOD PRESSURE: 84 MMHG | BODY MASS INDEX: 32.39 KG/M2 | SYSTOLIC BLOOD PRESSURE: 143 MMHG

## 2024-06-24 DIAGNOSIS — R42 DIZZINESS: Primary | ICD-10-CM

## 2024-06-24 DIAGNOSIS — R42 VERTIGO: ICD-10-CM

## 2024-06-24 LAB
ALBUMIN SERPL-MCNC: 4.7 G/DL (ref 3.4–5)
ALBUMIN/GLOB SERPL: 1.5 {RATIO} (ref 1.1–2.2)
ALP SERPL-CCNC: 76 U/L (ref 40–129)
ALT SERPL-CCNC: 17 U/L (ref 10–40)
ANION GAP SERPL CALCULATED.3IONS-SCNC: 12 MMOL/L (ref 3–16)
AST SERPL-CCNC: 17 U/L (ref 15–37)
BASOPHILS # BLD: 0.1 K/UL (ref 0–0.2)
BASOPHILS NFR BLD: 1 %
BILIRUB SERPL-MCNC: <0.2 MG/DL (ref 0–1)
BILIRUB UR QL STRIP.AUTO: NEGATIVE
BUN SERPL-MCNC: 13 MG/DL (ref 7–20)
CALCIUM SERPL-MCNC: 9.9 MG/DL (ref 8.3–10.6)
CHLORIDE SERPL-SCNC: 103 MMOL/L (ref 99–110)
CLARITY UR: ABNORMAL
CO2 SERPL-SCNC: 27 MMOL/L (ref 21–32)
COLOR UR: ABNORMAL
CREAT SERPL-MCNC: 0.9 MG/DL (ref 0.6–1.1)
DEPRECATED RDW RBC AUTO: 15.3 % (ref 12.4–15.4)
EOSINOPHIL # BLD: 0.1 K/UL (ref 0–0.6)
EOSINOPHIL NFR BLD: 0.9 %
GFR SERPLBLD CREATININE-BSD FMLA CKD-EPI: 75 ML/MIN/{1.73_M2}
GLUCOSE SERPL-MCNC: 113 MG/DL (ref 70–99)
GLUCOSE UR STRIP.AUTO-MCNC: NEGATIVE MG/DL
HCT VFR BLD AUTO: 42.8 % (ref 36–48)
HGB BLD-MCNC: 13.9 G/DL (ref 12–16)
HGB UR QL STRIP.AUTO: NEGATIVE
KETONES UR STRIP.AUTO-MCNC: NEGATIVE MG/DL
LEUKOCYTE ESTERASE UR QL STRIP.AUTO: NEGATIVE
LYMPHOCYTES # BLD: 2.5 K/UL (ref 1–5.1)
LYMPHOCYTES NFR BLD: 40.3 %
MAGNESIUM SERPL-MCNC: 2.1 MG/DL (ref 1.8–2.4)
MCH RBC QN AUTO: 28.2 PG (ref 26–34)
MCHC RBC AUTO-ENTMCNC: 32.5 G/DL (ref 31–36)
MCV RBC AUTO: 86.8 FL (ref 80–100)
MONOCYTES # BLD: 0.5 K/UL (ref 0–1.3)
MONOCYTES NFR BLD: 7.3 %
NEUTROPHILS # BLD: 3.2 K/UL (ref 1.7–7.7)
NEUTROPHILS NFR BLD: 50.5 %
NITRITE UR QL STRIP.AUTO: NEGATIVE
PH UR STRIP.AUTO: 6 [PH] (ref 5–8)
PLATELET # BLD AUTO: 332 K/UL (ref 135–450)
PMV BLD AUTO: 8.3 FL (ref 5–10.5)
POTASSIUM SERPL-SCNC: 3.5 MMOL/L (ref 3.5–5.1)
PROT SERPL-MCNC: 7.9 G/DL (ref 6.4–8.2)
PROT UR STRIP.AUTO-MCNC: NEGATIVE MG/DL
RBC # BLD AUTO: 4.92 M/UL (ref 4–5.2)
SODIUM SERPL-SCNC: 142 MMOL/L (ref 136–145)
SP GR UR STRIP.AUTO: >=1.03 (ref 1–1.03)
TROPONIN, HIGH SENSITIVITY: <6 NG/L (ref 0–14)
UA COMPLETE W REFLEX CULTURE PNL UR: ABNORMAL
UA DIPSTICK W REFLEX MICRO PNL UR: ABNORMAL
URN SPEC COLLECT METH UR: ABNORMAL
UROBILINOGEN UR STRIP-ACNC: 0.2 E.U./DL
WBC # BLD AUTO: 6.3 K/UL (ref 4–11)

## 2024-06-24 PROCEDURE — 2580000003 HC RX 258: Performed by: PHYSICIAN ASSISTANT

## 2024-06-24 PROCEDURE — 85025 COMPLETE CBC W/AUTO DIFF WBC: CPT

## 2024-06-24 PROCEDURE — 83735 ASSAY OF MAGNESIUM: CPT

## 2024-06-24 PROCEDURE — 84484 ASSAY OF TROPONIN QUANT: CPT

## 2024-06-24 PROCEDURE — 81003 URINALYSIS AUTO W/O SCOPE: CPT

## 2024-06-24 PROCEDURE — 93005 ELECTROCARDIOGRAM TRACING: CPT | Performed by: PHYSICIAN ASSISTANT

## 2024-06-24 PROCEDURE — 99284 EMERGENCY DEPT VISIT MOD MDM: CPT

## 2024-06-24 PROCEDURE — 80053 COMPREHEN METABOLIC PANEL: CPT

## 2024-06-24 PROCEDURE — 6370000000 HC RX 637 (ALT 250 FOR IP): Performed by: PHYSICIAN ASSISTANT

## 2024-06-24 RX ORDER — MECLIZINE HYDROCHLORIDE 25 MG/1
25 TABLET ORAL 3 TIMES DAILY PRN
Qty: 15 TABLET | Refills: 0 | Status: SHIPPED | OUTPATIENT
Start: 2024-06-24 | End: 2024-07-04

## 2024-06-24 RX ORDER — ONDANSETRON 4 MG/1
4 TABLET, ORALLY DISINTEGRATING ORAL ONCE
Status: COMPLETED | OUTPATIENT
Start: 2024-06-24 | End: 2024-06-24

## 2024-06-24 RX ORDER — ONDANSETRON 4 MG/1
4 TABLET, FILM COATED ORAL 3 TIMES DAILY PRN
Qty: 15 TABLET | Refills: 0 | Status: SHIPPED | OUTPATIENT
Start: 2024-06-24

## 2024-06-24 RX ORDER — 0.9 % SODIUM CHLORIDE 0.9 %
1000 INTRAVENOUS SOLUTION INTRAVENOUS ONCE
Status: COMPLETED | OUTPATIENT
Start: 2024-06-24 | End: 2024-06-24

## 2024-06-24 RX ORDER — MECLIZINE HCL 12.5 MG/1
25 TABLET ORAL ONCE
Status: COMPLETED | OUTPATIENT
Start: 2024-06-24 | End: 2024-06-24

## 2024-06-24 RX ADMIN — SODIUM CHLORIDE 1000 ML: 9 INJECTION, SOLUTION INTRAVENOUS at 18:49

## 2024-06-24 RX ADMIN — ONDANSETRON 4 MG: 4 TABLET, ORALLY DISINTEGRATING ORAL at 18:47

## 2024-06-24 RX ADMIN — MECLIZINE 25 MG: 12.5 TABLET ORAL at 18:47

## 2024-06-24 ASSESSMENT — PAIN DESCRIPTION - PAIN TYPE: TYPE: ACUTE PAIN

## 2024-06-24 ASSESSMENT — PAIN SCALES - GENERAL: PAINLEVEL_OUTOF10: 4

## 2024-06-24 ASSESSMENT — PAIN - FUNCTIONAL ASSESSMENT
PAIN_FUNCTIONAL_ASSESSMENT: 0-10
PAIN_FUNCTIONAL_ASSESSMENT: ACTIVITIES ARE NOT PREVENTED

## 2024-06-24 ASSESSMENT — PAIN DESCRIPTION - ORIENTATION: ORIENTATION: LEFT;LOWER

## 2024-06-24 ASSESSMENT — PAIN DESCRIPTION - DESCRIPTORS: DESCRIPTORS: CRAMPING;PRESSURE

## 2024-06-24 ASSESSMENT — LIFESTYLE VARIABLES
HOW OFTEN DO YOU HAVE A DRINK CONTAINING ALCOHOL: NEVER
HOW MANY STANDARD DRINKS CONTAINING ALCOHOL DO YOU HAVE ON A TYPICAL DAY: PATIENT DOES NOT DRINK

## 2024-06-24 ASSESSMENT — PAIN DESCRIPTION - LOCATION: LOCATION: ABDOMEN

## 2024-06-24 NOTE — ED PROVIDER NOTES
[facility]  ED  EMERGENCY DEPARTMENT ENCOUNTER        Pt Name: Chiqui Phillip  MRN: [id number]  Birthdate 1970  Date of evaluation: 6/24/2024  Provider: ALETHEA GOETZ PA-C  PCP: Butch Nava MD  ED Attending: MD Hugo      MAI. I have evaluated this patient.      CHIEF COMPLAINT:     Chief Complaint   Patient presents with    Dizziness     Patient reports that has been feeling bad 3-4 days. Lethargy, sleeping a lot. Patient reports dizziness started yesterday, today getting worse. Nausea denies vomiting.        HISTORY OF PRESENT ILLNESS:      History provided by the patient. No limitations.    Chiqui Phillip is a 54 y.o. female who arrives to the ED by private vehicle.  Patient is here stating she has felt \"bad\" for the past 3 to 4 days.  She describes generalized weakness, fatigue and essentially sleeping a lot.  Yesterday she began to feel dizzy.  She describes dizziness when she turns her head either way or when she gets up to walk around, but resolution of this upon sitting still.  She says symptoms became worse this morning.  She has had some nausea and a mild headache.  She denies vomiting.  Patient additionally denies any visual changes, syncope, chest pain, palpitations, shortness of breath, cough, abdominal pain, GI upset, urinary symptoms.  She has not been around anyone known to be ill.  She tried to make an appointment with PCP online today but states available appointments were a month out.    Nursing Notes were reviewed     REVIEW OF SYSTEMS:     Review of Systems  Positives and pertinent negatives as per HPI.      PAST MEDICAL HISTORY:     Past Medical History:   Diagnosis Date    DDD (degenerative disc disease)     Ehler's-Danlos syndrome     Factor V Leiden (HCC)     Fibromyalgia     Hyperlipidemia     Hypertension     IBS (irritable bowel syndrome)     Intervertebral disc protrusion     Migraine     MRSA (methicillin resistant staph aureus) culture positive 2/18/14

## 2024-06-25 LAB
EKG ATRIAL RATE: 70 BPM
EKG DIAGNOSIS: NORMAL
EKG P AXIS: 47 DEGREES
EKG P-R INTERVAL: 132 MS
EKG Q-T INTERVAL: 366 MS
EKG QRS DURATION: 84 MS
EKG QTC CALCULATION (BAZETT): 395 MS
EKG R AXIS: -1 DEGREES
EKG T AXIS: 44 DEGREES
EKG VENTRICULAR RATE: 70 BPM

## 2024-06-25 PROCEDURE — 93010 ELECTROCARDIOGRAM REPORT: CPT | Performed by: INTERNAL MEDICINE

## 2024-08-07 ENCOUNTER — HOSPITAL ENCOUNTER (EMERGENCY)
Age: 54
Discharge: HOME | End: 2024-08-07
Payer: MEDICARE

## 2024-08-07 ENCOUNTER — APPOINTMENT (OUTPATIENT)
Dept: CT IMAGING | Age: 54
End: 2024-08-07
Payer: MEDICARE

## 2024-08-07 VITALS
RESPIRATION RATE: 18 BRPM | SYSTOLIC BLOOD PRESSURE: 112 MMHG | BODY MASS INDEX: 28.86 KG/M2 | HEIGHT: 60 IN | WEIGHT: 147 LBS | OXYGEN SATURATION: 98 % | TEMPERATURE: 98.9 F | DIASTOLIC BLOOD PRESSURE: 63 MMHG | HEART RATE: 88 BPM

## 2024-08-07 DIAGNOSIS — K52.9 COLITIS: Primary | ICD-10-CM

## 2024-08-07 LAB
ALBUMIN SERPL-MCNC: 4 G/DL (ref 3.4–5)
ALP SERPL-CCNC: 59 U/L (ref 40–129)
ALT SERPL-CCNC: 10 U/L (ref 10–40)
ANION GAP SERPL CALCULATED.3IONS-SCNC: 12 MMOL/L (ref 3–16)
AST SERPL-CCNC: 12 U/L (ref 15–37)
BASOPHILS # BLD: 0.1 K/UL (ref 0–0.2)
BASOPHILS NFR BLD: 1.1 %
BILIRUB DIRECT SERPL-MCNC: <0.2 MG/DL (ref 0–0.3)
BILIRUB INDIRECT SERPL-MCNC: ABNORMAL MG/DL (ref 0–1)
BILIRUB SERPL-MCNC: 0.3 MG/DL (ref 0–1)
BILIRUB UR QL STRIP.AUTO: NEGATIVE
BUN SERPL-MCNC: 12 MG/DL (ref 7–20)
CALCIUM SERPL-MCNC: 9.7 MG/DL (ref 8.3–10.6)
CHLORIDE SERPL-SCNC: 103 MMOL/L (ref 99–110)
CLARITY UR: CLEAR
CO2 SERPL-SCNC: 24 MMOL/L (ref 21–32)
COLOR UR: YELLOW
CREAT SERPL-MCNC: 0.9 MG/DL (ref 0.6–1.1)
DEPRECATED RDW RBC AUTO: 16.4 % (ref 12.4–15.4)
EOSINOPHIL # BLD: 0.1 K/UL (ref 0–0.6)
EOSINOPHIL NFR BLD: 1.7 %
GFR SERPLBLD CREATININE-BSD FMLA CKD-EPI: 75 ML/MIN/{1.73_M2}
GLUCOSE SERPL-MCNC: 97 MG/DL (ref 70–99)
GLUCOSE UR STRIP.AUTO-MCNC: NEGATIVE MG/DL
HCT VFR BLD AUTO: 39.9 % (ref 36–48)
HGB BLD-MCNC: 13.1 G/DL (ref 12–16)
HGB UR QL STRIP.AUTO: NEGATIVE
KETONES UR STRIP.AUTO-MCNC: NEGATIVE MG/DL
LEUKOCYTE ESTERASE UR QL STRIP.AUTO: NEGATIVE
LIPASE SERPL-CCNC: 29 U/L (ref 13–60)
LYMPHOCYTES # BLD: 2.5 K/UL (ref 1–5.1)
LYMPHOCYTES NFR BLD: 39.9 %
MCH RBC QN AUTO: 28.6 PG (ref 26–34)
MCHC RBC AUTO-ENTMCNC: 32.9 G/DL (ref 31–36)
MCV RBC AUTO: 87.2 FL (ref 80–100)
MONOCYTES # BLD: 0.4 K/UL (ref 0–1.3)
MONOCYTES NFR BLD: 5.7 %
NEUTROPHILS # BLD: 3.3 K/UL (ref 1.7–7.7)
NEUTROPHILS NFR BLD: 51.6 %
NITRITE UR QL STRIP.AUTO: NEGATIVE
PH UR STRIP.AUTO: 5.5 [PH] (ref 5–8)
PLATELET # BLD AUTO: 308 K/UL (ref 135–450)
PMV BLD AUTO: 7 FL (ref 5–10.5)
POTASSIUM SERPL-SCNC: 3.8 MMOL/L (ref 3.5–5.1)
PROT SERPL-MCNC: 6.6 G/DL (ref 6.4–8.2)
PROT UR STRIP.AUTO-MCNC: NEGATIVE MG/DL
RBC # BLD AUTO: 4.58 M/UL (ref 4–5.2)
SODIUM SERPL-SCNC: 139 MMOL/L (ref 136–145)
SP GR UR STRIP.AUTO: 1.01 (ref 1–1.03)
UA COMPLETE W REFLEX CULTURE PNL UR: NORMAL
UA DIPSTICK W REFLEX MICRO PNL UR: NORMAL
URN SPEC COLLECT METH UR: NORMAL
UROBILINOGEN UR STRIP-ACNC: 0.2 E.U./DL
WBC # BLD AUTO: 6.3 K/UL (ref 4–11)

## 2024-08-07 PROCEDURE — 80048 BASIC METABOLIC PNL TOTAL CA: CPT

## 2024-08-07 PROCEDURE — 99285 EMERGENCY DEPT VISIT HI MDM: CPT

## 2024-08-07 PROCEDURE — 74177 CT ABD & PELVIS W/CONTRAST: CPT

## 2024-08-07 PROCEDURE — 6360000002 HC RX W HCPCS

## 2024-08-07 PROCEDURE — 83690 ASSAY OF LIPASE: CPT

## 2024-08-07 PROCEDURE — 81003 URINALYSIS AUTO W/O SCOPE: CPT

## 2024-08-07 PROCEDURE — 80076 HEPATIC FUNCTION PANEL: CPT

## 2024-08-07 PROCEDURE — 6360000004 HC RX CONTRAST MEDICATION

## 2024-08-07 PROCEDURE — 96374 THER/PROPH/DIAG INJ IV PUSH: CPT

## 2024-08-07 PROCEDURE — 85025 COMPLETE CBC W/AUTO DIFF WBC: CPT

## 2024-08-07 PROCEDURE — 6370000000 HC RX 637 (ALT 250 FOR IP)

## 2024-08-07 PROCEDURE — 96375 TX/PRO/DX INJ NEW DRUG ADDON: CPT

## 2024-08-07 RX ORDER — PANTOPRAZOLE SODIUM 40 MG/10ML
40 INJECTION, POWDER, LYOPHILIZED, FOR SOLUTION INTRAVENOUS ONCE
Status: COMPLETED | OUTPATIENT
Start: 2024-08-07 | End: 2024-08-07

## 2024-08-07 RX ORDER — AMOXICILLIN AND CLAVULANATE POTASSIUM 875; 125 MG/1; MG/1
1 TABLET, FILM COATED ORAL ONCE
Status: COMPLETED | OUTPATIENT
Start: 2024-08-07 | End: 2024-08-07

## 2024-08-07 RX ORDER — KETOROLAC TROMETHAMINE 30 MG/ML
15 INJECTION, SOLUTION INTRAMUSCULAR; INTRAVENOUS ONCE
Status: COMPLETED | OUTPATIENT
Start: 2024-08-07 | End: 2024-08-07

## 2024-08-07 RX ORDER — AMOXICILLIN AND CLAVULANATE POTASSIUM 875; 125 MG/1; MG/1
1 TABLET, FILM COATED ORAL 2 TIMES DAILY
Qty: 14 TABLET | Refills: 0 | Status: SHIPPED | OUTPATIENT
Start: 2024-08-07 | End: 2024-08-14

## 2024-08-07 RX ADMIN — PANTOPRAZOLE SODIUM 40 MG: 40 INJECTION, POWDER, FOR SOLUTION INTRAVENOUS at 21:23

## 2024-08-07 RX ADMIN — KETOROLAC TROMETHAMINE 15 MG: 30 INJECTION, SOLUTION INTRAMUSCULAR at 21:24

## 2024-08-07 RX ADMIN — IOPAMIDOL 75 ML: 755 INJECTION, SOLUTION INTRAVENOUS at 19:47

## 2024-08-07 RX ADMIN — AMOXICILLIN AND CLAVULANATE POTASSIUM 1 TABLET: 875; 125 TABLET, FILM COATED ORAL at 21:45

## 2024-08-07 ASSESSMENT — PAIN SCALES - GENERAL
PAINLEVEL_OUTOF10: 5
PAINLEVEL_OUTOF10: 8

## 2024-08-07 ASSESSMENT — PAIN DESCRIPTION - LOCATION: LOCATION: ABDOMEN

## 2024-08-07 ASSESSMENT — PAIN - FUNCTIONAL ASSESSMENT: PAIN_FUNCTIONAL_ASSESSMENT: 0-10

## 2024-08-07 NOTE — ED PROVIDER NOTES
[facility]  ED  EMERGENCY DEPARTMENT ENCOUNTER        Pt Name: Chiqui Phillip  MRN: [id number]  Birthdate 1970  Date of evaluation: 8/7/2024  Provider: ASHLEY Tom Jr  PCP: Butch Nava MD  Note Started: 6:11 PM EDT 8/7/24      MAI. I have evaluated this patient.        CHIEF COMPLAINT       Chief Complaint   Patient presents with    Abdominal Pain     Generalized abdominal pain since Monday night with nausea. States bowel movements have been mucous-like.        HISTORY OF PRESENT ILLNESS: 1 or more Elements     History from : Patient    Limitations to history : None    Chiqui Phillip is a 54 y.o. female who presents complaints of cramping abdominal pain with associated mucousy diarrhea that has been ongoing for a couple of days.  States that she has not had any actual bowel movements in approximately 1 week.  Monitor for the past few weeks she restarted lisinopril entirely approximately 3 weeks ago.  She patient is unsure what her dosages.  States she has had some occasional elevated temperatures of 99.5 but no chills or bodyaches otherwise.  She has no other complaints at this time.  Review of systems otherwise negative.    Nursing Notes were all reviewed and agreed with or any disagreements were addressed in the HPI.      SURGICAL HISTORY     Past Surgical History:   Procedure Laterality Date    CARDIAC CATHETERIZATION  2/28/2014    COLONOSCOPY      ENDOSCOPY, COLON, DIAGNOSTIC      EYE SURGERY      B Lasik    FOOT SURGERY      multiple/ bilat.    FRACTURE SURGERY      L foot    HYSTERECTOMY (CERVIX STATUS UNKNOWN)      LAPAROSCOPY  3/16/2012    Diagnostic Laparoscopy, lysis of adhesions, pelvic exam    TONSILLECTOMY      UPPER GASTROINTESTINAL ENDOSCOPY  9/1012       CURRENTMEDICATIONS       Discharge Medication List as of 8/7/2024  9:43 PM        CONTINUE these medications which have NOT CHANGED    Details   SEMAGLUTIDE-WEIGHT MANAGEMENT SC Inject into the skinHistorical Med

## 2025-01-10 DIAGNOSIS — I10 PRIMARY HYPERTENSION: ICD-10-CM

## 2025-01-10 RX ORDER — AMLODIPINE BESYLATE 2.5 MG/1
2.5 TABLET ORAL DAILY
Qty: 30 TABLET | Refills: 4 | Status: SHIPPED | OUTPATIENT
Start: 2025-01-10

## 2025-01-10 NOTE — TELEPHONE ENCOUNTER
Front- can you call and schedule ov with pt in may? Thank you      Last Office Visit: 5/24/2024 Provider: FXW  **Is provider OOT? No      **If no OV, when does pt need to be seen? in 5 month(s)  **Has patient already had 30 day supply? No  Lab orders needed? no   Encounter provider correct? Yes If not, change provider  Script changes since last refill? no    LAST LABS:   BMP:   Lab Results   Component Value Date/Time     08/07/2024 06:50 PM    K 3.8 08/07/2024 06:50 PM     08/07/2024 06:50 PM    CO2 24 08/07/2024 06:50 PM    BUN 12 08/07/2024 06:50 PM    CREATININE 0.9 08/07/2024 06:50 PM    GLUCOSE 97 08/07/2024 06:50 PM    CALCIUM 9.7 08/07/2024 06:50 PM    LABGLOM 75 08/07/2024 06:50 PM    LABGLOM 59 11/22/2023 07:20 AM       CBC:   Lab Results   Component Value Date    WBC 6.3 08/07/2024    HGB 13.1 08/07/2024    HCT 39.9 08/07/2024    MCV 87.2 08/07/2024     08/07/2024

## 2025-04-15 DIAGNOSIS — I10 PRIMARY HYPERTENSION: ICD-10-CM

## 2025-04-15 RX ORDER — AMLODIPINE BESYLATE 2.5 MG/1
2.5 TABLET ORAL DAILY
Qty: 90 TABLET | Refills: 1 | Status: SHIPPED | OUTPATIENT
Start: 2025-04-15

## 2025-05-20 NOTE — TELEPHONE ENCOUNTER
Pt informed of medication instructions    - The morning of the procedure hold morphine, vitamins/supplements  - take all other medication with a small sip of water including aspirin (4) rarely moist

## 2025-05-22 ENCOUNTER — OFFICE VISIT (OUTPATIENT)
Age: 55
End: 2025-05-22

## 2025-05-22 VITALS
OXYGEN SATURATION: 97 % | BODY MASS INDEX: 28.86 KG/M2 | DIASTOLIC BLOOD PRESSURE: 64 MMHG | HEIGHT: 60 IN | HEART RATE: 79 BPM | TEMPERATURE: 98 F | WEIGHT: 147 LBS | SYSTOLIC BLOOD PRESSURE: 118 MMHG

## 2025-05-22 DIAGNOSIS — S29.9XXA CHEST WALL INJURY, INITIAL ENCOUNTER: Primary | ICD-10-CM

## 2025-05-22 DIAGNOSIS — S20.211A RIB CONTUSION, RIGHT, INITIAL ENCOUNTER: ICD-10-CM

## 2025-05-22 RX ORDER — LIDOCAINE 50 MG/G
1 PATCH TOPICAL DAILY
Qty: 10 PATCH | Refills: 0 | Status: SHIPPED | OUTPATIENT
Start: 2025-05-22 | End: 2025-06-01

## 2025-05-22 NOTE — PROGRESS NOTES
Chiqui Phillip (:  1970) is a 54 y.o. female,  here for evaluation of the following chief complaint(s): Rib Pain (Leaned on suitcase 25 and ribs have continued to hurt since.)    Chiqui Phillip is a: New patient.   Last Urgent Care Visit: Visit date not found  I have reviewed the patient's medications and basic medical history; see Medication Reconciliation.    ASSESSMENT/PLAN:  Diagnosis:     ICD-10-CM    1. Chest wall injury, initial encounter  S29.9XXA XR RIBS RIGHT INCLUDE CHEST (MIN 3 VIEWS)     lidocaine (LIDODERM) 5 %     diclofenac sodium (VOLTAREN) 1 % GEL      2. Rib contusion, right, initial encounter  S20.211A diclofenac sodium (VOLTAREN) 1 % GEL             Medical Decision Making:   Patient was seen at Urgent Care today for continued right chest wall and rib pain after fall onto chest wall and ribs on 2025.    On exam she appears well.  There is tenderness to the lateral and anterior ribs.  Lungs clear.    Based on history and physical examination, differential diagnosis includes but is not limited to: Fracture; and Contusion;     Xray obtained to evaluate for above differential.   Xray was initially interpreted by me for a wet read while awaiting official radiologist report. Based on my initial interpretation Xray showed: No fracture or acute findings     Radiologist report: Pending    I suspect chest wall and rib contusion only.  Patient is prescribed topical diclofenac.   We will try Lidoderm patches as well. She already has a muscle relaxer at home.     She is advised follow-up with primary care.    Patient was discharged home with follow-up and return precautions.    Patient did not have elevated blood pressure greater than 130/80. Therefore, referral to PCP for HTN is not indicated      Results:  No results found for any visits on 25.       SUBJECTIVE/OBJECTIVE:  HPI  This is a 54 y.o. female that presents today with complaint of right anterior and lateral rib pain.   Pt

## 2025-05-23 NOTE — PATIENT INSTRUCTIONS
Xrays were negative. These will be read by a radiologist who will give an official report, typically in the next couple hours. If they saw anything that we did not, we will call you.     Apply ice to the affected area x 20 minutes several times a day. (20 minutes on followed by 20 minutes off).     Medication as prescribed.     Follow-up with primary care for additional evaluation and management.

## 2025-05-24 ENCOUNTER — RESULTS FOLLOW-UP (OUTPATIENT)
Age: 55
End: 2025-05-24

## 2025-05-27 NOTE — PROGRESS NOTES
cardiac cath with near normal coronaries.  Has mild exertional shortness of breath which is nonlimiting  Possible coronary vasospasm  Possible coronary microvascular dysfunction  Chronic shortness of breath  Hyperlipidemia   Hypertension    No anginal or heart failure symptoms.  Patient stopped taking Repatha a few months ago.  Denies any new cardiac symptoms.    PLAN:  Continue current cardiac medications   Medications reviewed. Medications refilled as warranted  Okay to stop Ranexa 1000 mg BID  Stop taking amlodipine 2.5 mg due to lack of benefit  Recommend a cardiac healthy diet (low salt, avoid red meat, avoid fatty or fried foods, lots of fruits and vegetables) as well as regular moderate intensity activity for 30 minutes per day  Blood work: Lipid, lft- fast for 8 hours prior to blood work. Will discuss adding back Repatha after results are obtained   Follow up with me as needed    Scribe's attestation: This note was scribed in the presence of Dr. Gagandeep Mccain M.D. By Karissa Pagan RN     All questions and concerns were addressed to the patient/family. Alternatives to my treatment as well as risks and benefits of proposed treatment were discussed and understood by patient/family.       Medical Decision Making:  The following items were considered in medical decision making:  Independent review of images  Review / order clinical lab tests  Review / order radiology tests  Decision to obtain old records  Review and summation of old records as accessed through [employer] (a summary of my findings in these old records)      Time Based Itemization  A total of 32 minutes was spent on today's patient encounter.  If applicable, non-patient-facing activities:  (X)Preparing to see the patient and reviewing records  (X) Individual interpretation of results  ( ) Discussion or coordination of care with other health care professionals  () Ordering of unique tests, medications, or procedures  (X) Documentation within the

## 2025-05-30 ENCOUNTER — OFFICE VISIT (OUTPATIENT)
Dept: CARDIOLOGY CLINIC | Age: 55
End: 2025-05-30
Payer: MEDICARE

## 2025-05-30 VITALS
HEART RATE: 89 BPM | OXYGEN SATURATION: 100 % | SYSTOLIC BLOOD PRESSURE: 118 MMHG | HEIGHT: 60 IN | BODY MASS INDEX: 28.66 KG/M2 | DIASTOLIC BLOOD PRESSURE: 70 MMHG | WEIGHT: 146 LBS

## 2025-05-30 DIAGNOSIS — E78.2 MIXED HYPERLIPIDEMIA: ICD-10-CM

## 2025-05-30 DIAGNOSIS — Z79.899 MEDICATION MANAGEMENT: Primary | ICD-10-CM

## 2025-05-30 PROCEDURE — G8427 DOCREV CUR MEDS BY ELIG CLIN: HCPCS | Performed by: INTERNAL MEDICINE

## 2025-05-30 PROCEDURE — 3074F SYST BP LT 130 MM HG: CPT | Performed by: INTERNAL MEDICINE

## 2025-05-30 PROCEDURE — G8417 CALC BMI ABV UP PARAM F/U: HCPCS | Performed by: INTERNAL MEDICINE

## 2025-05-30 PROCEDURE — 3078F DIAST BP <80 MM HG: CPT | Performed by: INTERNAL MEDICINE

## 2025-05-30 PROCEDURE — 99214 OFFICE O/P EST MOD 30 MIN: CPT | Performed by: INTERNAL MEDICINE

## 2025-05-30 PROCEDURE — 3017F COLORECTAL CA SCREEN DOC REV: CPT | Performed by: INTERNAL MEDICINE

## 2025-05-30 PROCEDURE — 1036F TOBACCO NON-USER: CPT | Performed by: INTERNAL MEDICINE

## 2025-05-30 PROCEDURE — G2211 COMPLEX E/M VISIT ADD ON: HCPCS | Performed by: INTERNAL MEDICINE

## 2025-05-30 RX ORDER — DESVENLAFAXINE 50 MG/1
50 TABLET, FILM COATED, EXTENDED RELEASE ORAL DAILY
COMMUNITY

## 2025-05-30 NOTE — PATIENT INSTRUCTIONS
PLAN:  Continue current cardiac medications   Medications reviewed. Medications refilled as warranted.   Okay to stop Ranexa 1000 mg BID  Stop taking amlodipine 2.5 mg  Recommend a cardiac healthy diet (low salt, avoid red meat, avoid fatty or fried foods, lots of fruits and vegetables) as well as regular moderate intensity activity for 30 minutes per day  Blood work: Lipid, lft- fast for 8 hours prior to blood work. Will discuss adding back Repatha after results are obtained.   Follow up with me in 1 year or sooner if needed.